# Patient Record
Sex: MALE | Race: WHITE | NOT HISPANIC OR LATINO | Employment: OTHER | URBAN - METROPOLITAN AREA
[De-identification: names, ages, dates, MRNs, and addresses within clinical notes are randomized per-mention and may not be internally consistent; named-entity substitution may affect disease eponyms.]

---

## 2017-04-30 ENCOUNTER — OFFICE VISIT (OUTPATIENT)
Dept: URGENT CARE | Facility: CLINIC | Age: 72
End: 2017-04-30
Payer: MEDICARE

## 2017-04-30 ENCOUNTER — HOSPITAL ENCOUNTER (OUTPATIENT)
Dept: RADIOLOGY | Facility: CLINIC | Age: 72
Discharge: HOME | End: 2017-04-30
Admitting: EMERGENCY MEDICINE
Payer: MEDICARE

## 2017-04-30 DIAGNOSIS — R50.9 FEVER: ICD-10-CM

## 2017-04-30 PROCEDURE — 99204 OFFICE O/P NEW MOD 45 MIN: CPT

## 2017-04-30 PROCEDURE — G0463 HOSPITAL OUTPT CLINIC VISIT: HCPCS

## 2017-04-30 PROCEDURE — 71020 HB CHEST X-RAY 2VW FRONTAL&LATL: CPT

## 2018-03-10 ENCOUNTER — OFFICE VISIT (OUTPATIENT)
Dept: URGENT CARE | Facility: CLINIC | Age: 73
End: 2018-03-10
Payer: MEDICARE

## 2018-03-10 VITALS
TEMPERATURE: 99 F | OXYGEN SATURATION: 98 % | HEART RATE: 65 BPM | DIASTOLIC BLOOD PRESSURE: 72 MMHG | SYSTOLIC BLOOD PRESSURE: 151 MMHG

## 2018-03-10 DIAGNOSIS — J06.9 UPPER RESPIRATORY TRACT INFECTION, UNSPECIFIED TYPE: Primary | ICD-10-CM

## 2018-03-10 PROCEDURE — 99213 OFFICE O/P EST LOW 20 MIN: CPT | Performed by: PHYSICIAN ASSISTANT

## 2018-03-10 PROCEDURE — G0463 HOSPITAL OUTPT CLINIC VISIT: HCPCS | Performed by: PHYSICIAN ASSISTANT

## 2018-03-10 RX ORDER — OXYCODONE AND ACETAMINOPHEN 7.5; 325 MG/1; MG/1
7.5 TABLET ORAL 2 TIMES DAILY PRN
COMMUNITY

## 2018-03-10 RX ORDER — TAMSULOSIN HYDROCHLORIDE 0.4 MG/1
CAPSULE ORAL
COMMUNITY

## 2018-03-10 RX ORDER — FOLIC ACID 1 MG/1
3 TABLET ORAL DAILY
COMMUNITY

## 2018-03-10 RX ORDER — PREDNISONE 10 MG/1
TABLET ORAL
COMMUNITY
End: 2018-08-08

## 2018-03-10 RX ORDER — AZITHROMYCIN 250 MG/1
TABLET, FILM COATED ORAL
Qty: 6 TABLET | Refills: 0 | Status: SHIPPED | OUTPATIENT
Start: 2018-03-10 | End: 2018-03-15

## 2018-03-10 RX ORDER — NICOTINE POLACRILEX 4 MG/1
GUM, CHEWING ORAL
COMMUNITY
End: 2019-04-05

## 2018-03-10 NOTE — PROGRESS NOTES
[facility]    NAME: Pamela Mena is a 67 y o  male  : 1945    MRN: [id number]  DATE: March 10, 2018  TIME: 8:47 AM    Assessment and Plan   Upper respiratory tract infection, unspecified type [J06 9]  1  Upper respiratory tract infection, unspecified type  azithromycin (ZITHROMAX) 250 mg tablet       Patient Instructions     Patient Instructions   I have prescribed an antibiotic for the infection  Please take the antibiotic as prescribed and finish the entire prescription  I recommend that the patient takes an over the counter probiotic or eats yogurt with live cultures in it [place]) to keep good bacteria in the gut and help prevent diarrhea  Wash hands frequently to prevent the spread of infection  Can use over the counter cough and cold medications to help with symptoms  Ibuprofen and/or tylenol as needed for pain or fever  If not improving over the next 7-10 days, follow up with PCP  Chief Complaint     Chief Complaint   Patient presents with    Extremity Pain     Since Wednesday, 'might have a fever' 'May be my polymylagia"       History of Present Illness   71-year-old male here with complaint of cold symptoms for the last 3-4 days  Has a lot of body aches and myalgias  Patient has a history of PMR and use unsure if that is flaring up or not  Has a lot of nasal congestion with a cough that is productive at times of mucus  Also has a low-grade fever  Has a mild sore throat  Review of Systems   Review of Systems   Constitutional: Positive for fatigue and fever  Negative for activity change, appetite change, chills, diaphoresis and unexpected weight change  HENT: Positive for congestion, postnasal drip, rhinorrhea, sinus pressure and sore throat  Negative for ear pain, hearing loss, sneezing and tinnitus  Eyes: Negative for photophobia, redness and visual disturbance  Respiratory: Positive for cough   Negative for apnea, chest tightness, shortness of breath, wheezing and stridor  Cardiovascular: Negative for chest pain, palpitations and leg swelling  Gastrointestinal: Negative for abdominal distention, abdominal pain, blood in stool, constipation, diarrhea, nausea and vomiting  Endocrine: Negative for cold intolerance, heat intolerance, polydipsia, polyphagia and polyuria  Genitourinary: Negative for difficulty urinating, dysuria, flank pain, hematuria and urgency  Musculoskeletal: Negative for arthralgias, back pain, gait problem, myalgias, neck pain and neck stiffness  Skin: Negative for rash and wound  Neurological: Negative for dizziness, tremors, seizures, syncope, weakness, light-headedness, numbness and headaches  Hematological: Negative for adenopathy  Does not bruise/bleed easily  Psychiatric/Behavioral: Negative for agitation, behavioral problems, confusion and decreased concentration  The patient is not nervous/anxious  All other systems reviewed and are negative  Current Medications     Current Outpatient Prescriptions:     azithromycin (ZITHROMAX) 250 mg tablet, Take 2 tablets then one tablet daily for 4 days  , Disp: 6 tablet, Rfl: 0    cholecalciferol (VITAMIN D3) 1,000 units tablet, Take by mouth, Disp: , Rfl:     folic acid (FOLVITE) 1 mg tablet, Take by mouth, Disp: , Rfl:     methotrexate 2 5 mg tablet, Take by mouth, Disp: , Rfl:     Omeprazole 20 MG TBEC, Take by mouth, Disp: , Rfl:     oxyCODONE-acetaminophen (PERCOCET) 7 5-325 MG per tablet, Take 7 5 tablets by mouth 2 (two) times a day as needed, Disp: , Rfl:     predniSONE 10 mg tablet, Take by mouth, Disp: , Rfl:     tamsulosin (FLOMAX) 0 4 mg, Take by mouth, Disp: , Rfl:     Current Allergies     Allergies as of 03/10/2018    (No Known Allergies)          The following portions of the patient's history were reviewed and updated as appropriate: allergies, current medications, past family history, past medical history, past social history, past surgical history and problem list      Objective   /72   Pulse 65   Temp 99 °F (37 2 °C)   SpO2 98%      Physical Exam   Physical Exam   Constitutional: He appears well-developed and well-nourished  No distress  HENT:   Head: Normocephalic  Right Ear: Tympanic membrane and external ear normal    Left Ear: Tympanic membrane and external ear normal    Nose: Mucosal edema and rhinorrhea present  Mouth/Throat: Posterior oropharyngeal edema and posterior oropharyngeal erythema present  No oropharyngeal exudate  Neck: Normal range of motion  Neck supple  Cardiovascular: Normal rate, regular rhythm and normal heart sounds  No murmur heard  Pulmonary/Chest: Effort normal and breath sounds normal  No respiratory distress  He has no wheezes  He has no rales  Abdominal: Soft  Bowel sounds are normal  There is no tenderness  Musculoskeletal: Normal range of motion  Lymphadenopathy:     He has no cervical adenopathy  Skin: Skin is warm  No rash noted

## 2018-08-08 ENCOUNTER — HOSPITAL ENCOUNTER (EMERGENCY)
Facility: HOSPITAL | Age: 73
End: 2018-08-08
Attending: EMERGENCY MEDICINE | Admitting: EMERGENCY MEDICINE
Payer: MEDICARE

## 2018-08-08 ENCOUNTER — HOSPITAL ENCOUNTER (INPATIENT)
Dept: NON INVASIVE DIAGNOSTICS | Facility: HOSPITAL | Age: 73
LOS: 2 days | Discharge: HOME | DRG: 247 | End: 2018-08-10
Attending: INTERNAL MEDICINE | Admitting: INTERNAL MEDICINE
Payer: MEDICARE

## 2018-08-08 ENCOUNTER — APPOINTMENT (INPATIENT)
Dept: NON INVASIVE DIAGNOSTICS | Facility: HOSPITAL | Age: 73
DRG: 247 | End: 2018-08-08
Payer: MEDICARE

## 2018-08-08 VITALS
TEMPERATURE: 96.5 F | HEART RATE: 54 BPM | BODY MASS INDEX: 28.63 KG/M2 | WEIGHT: 200 LBS | SYSTOLIC BLOOD PRESSURE: 164 MMHG | DIASTOLIC BLOOD PRESSURE: 89 MMHG | RESPIRATION RATE: 24 BRPM | OXYGEN SATURATION: 100 % | HEIGHT: 70 IN

## 2018-08-08 DIAGNOSIS — I21.3 ACUTE ST ELEVATION MYOCARDIAL INFARCTION (STEMI), UNSPECIFIED ARTERY (HCC): Primary | ICD-10-CM

## 2018-08-08 DIAGNOSIS — I21.19 ACUTE ST ELEVATION MYOCARDIAL INFARCTION (STEMI) OF INFERIOR WALL (HCC): Primary | ICD-10-CM

## 2018-08-08 DIAGNOSIS — I21.9 MI (MYOCARDIAL INFARCTION) (HCC): ICD-10-CM

## 2018-08-08 PROBLEM — E87.6 HYPOKALEMIA: Status: ACTIVE | Noted: 2018-08-08

## 2018-08-08 PROBLEM — Z95.5 S/P DRUG ELUTING CORONARY STENT PLACEMENT: Status: ACTIVE | Noted: 2018-08-08

## 2018-08-08 LAB
ALBUMIN SERPL BCP-MCNC: 4.4 G/DL (ref 3.5–5.7)
ALP SERPL-CCNC: 47 U/L (ref 55–165)
ALT SERPL W P-5'-P-CCNC: 29 U/L (ref 7–52)
ANION GAP SERPL CALCULATED.3IONS-SCNC: 7 MMOL/L (ref 4–13)
ANION GAP SERPL CALCULATED.3IONS-SCNC: 9 MMOL/L (ref 4–13)
APTT PPP: 29 SECONDS (ref 24–36)
AST SERPL W P-5'-P-CCNC: 20 U/L (ref 13–39)
ATRIAL RATE: 57 BPM
ATRIAL RATE: 58 BPM
ATRIAL RATE: 62 BPM
BASOPHILS # BLD AUTO: 0 THOUSANDS/ΜL (ref 0–0.1)
BASOPHILS NFR BLD AUTO: 0 % (ref 0–2)
BILIRUB SERPL-MCNC: 0.6 MG/DL (ref 0.2–1)
BUN SERPL-MCNC: 12 MG/DL (ref 5–25)
BUN SERPL-MCNC: 13 MG/DL (ref 7–25)
CALCIUM SERPL-MCNC: 8.4 MG/DL (ref 8.3–10.1)
CALCIUM SERPL-MCNC: 9.5 MG/DL (ref 8.6–10.5)
CHLORIDE SERPL-SCNC: 105 MMOL/L (ref 98–107)
CHLORIDE SERPL-SCNC: 107 MMOL/L (ref 100–108)
CHOLEST SERPL-MCNC: 177 MG/DL (ref 0–200)
CO2 SERPL-SCNC: 25 MMOL/L (ref 21–32)
CO2 SERPL-SCNC: 27 MMOL/L (ref 21–31)
CREAT SERPL-MCNC: 1.04 MG/DL (ref 0.6–1.3)
CREAT SERPL-MCNC: 1.15 MG/DL (ref 0.7–1.3)
EOSINOPHIL # BLD AUTO: 0.1 THOUSAND/ΜL (ref 0–0.61)
EOSINOPHIL NFR BLD AUTO: 2 % (ref 0–5)
ERYTHROCYTE [DISTWIDTH] IN BLOOD BY AUTOMATED COUNT: 13.6 % (ref 11.5–14.5)
GFR SERPL CREATININE-BSD FRML MDRD: 63 ML/MIN/1.73SQ M
GFR SERPL CREATININE-BSD FRML MDRD: 71 ML/MIN/1.73SQ M
GLUCOSE SERPL-MCNC: 116 MG/DL (ref 65–140)
GLUCOSE SERPL-MCNC: 96 MG/DL (ref 65–99)
HCT VFR BLD AUTO: 41.8 % (ref 36.5–49.3)
HDLC SERPL-MCNC: 38 MG/DL (ref 40–60)
HGB BLD-MCNC: 14.7 G/DL (ref 14–18)
INR PPP: 0.97 (ref 0.9–1.5)
KCT BLD-ACNC: 248 SEC (ref 89–137)
LDLC SERPL CALC-MCNC: 104 MG/DL (ref 75–193)
LYMPHOCYTES # BLD AUTO: 3.1 THOUSANDS/ΜL (ref 0.6–4.47)
LYMPHOCYTES NFR BLD AUTO: 47 % (ref 21–51)
MAGNESIUM SERPL-MCNC: 1.8 MG/DL (ref 1.6–2.6)
MAGNESIUM SERPL-MCNC: 1.8 MG/DL (ref 1.9–2.7)
MCH RBC QN AUTO: 33.1 PG (ref 26–34)
MCHC RBC AUTO-ENTMCNC: 35 G/DL (ref 31–37)
MCV RBC AUTO: 94 FL (ref 81–99)
MONOCYTES # BLD AUTO: 0.7 THOUSAND/ΜL (ref 0.17–1.22)
MONOCYTES NFR BLD AUTO: 11 % (ref 2–12)
NEUTROPHILS # BLD AUTO: 2.7 THOUSANDS/ΜL (ref 1.4–6.5)
NEUTS SEG NFR BLD AUTO: 40 % (ref 42–75)
NONHDLC SERPL-MCNC: 139 MG/DL
NRBC BLD AUTO-RTO: 0 /100 WBCS
P AXIS: 61 DEGREES
P AXIS: 64 DEGREES
P AXIS: 66 DEGREES
PLATELET # BLD AUTO: 166 THOUSANDS/UL (ref 149–390)
PLATELET # BLD AUTO: 216 THOUSANDS/UL (ref 149–390)
PMV BLD AUTO: 10 FL (ref 8.9–12.7)
PMV BLD AUTO: 8.5 FL (ref 8.6–11.7)
POTASSIUM SERPL-SCNC: 3.3 MMOL/L (ref 3.5–5.5)
POTASSIUM SERPL-SCNC: 4.2 MMOL/L (ref 3.5–5.3)
PR INTERVAL: 120 MS
PR INTERVAL: 179 MS
PR INTERVAL: 183 MS
PROT SERPL-MCNC: 6.4 G/DL (ref 6.4–8.9)
PROTHROMBIN TIME: 11.3 SECONDS (ref 10.1–12.9)
QRS AXIS: 29 DEGREES
QRS AXIS: 58 DEGREES
QRS AXIS: 64 DEGREES
QRSD INTERVAL: 88 MS
QRSD INTERVAL: 92 MS
QRSD INTERVAL: 94 MS
QT INTERVAL: 398 MS
QT INTERVAL: 429 MS
QT INTERVAL: 450 MS
QTC INTERVAL: 403 MS
QTC INTERVAL: 418 MS
QTC INTERVAL: 442 MS
RBC # BLD AUTO: 4.43 MILLION/UL (ref 4.3–5.9)
SODIUM SERPL-SCNC: 139 MMOL/L (ref 136–145)
SODIUM SERPL-SCNC: 141 MMOL/L (ref 134–143)
SPECIMEN SOURCE: ABNORMAL
T WAVE AXIS: 104 DEGREES
T WAVE AXIS: 26 DEGREES
T WAVE AXIS: 65 DEGREES
TRIGL SERPL-MCNC: 173 MG/DL (ref 44–166)
TROPONIN I SERPL-MCNC: 0.04 NG/ML
TROPONIN I SERPL-MCNC: 0.37 NG/ML
TROPONIN I SERPL-MCNC: 1.05 NG/ML
TROPONIN I SERPL-MCNC: 1.38 NG/ML
VENTRICULAR RATE: 57 BPM
VENTRICULAR RATE: 58 BPM
VENTRICULAR RATE: 62 BPM
WBC # BLD AUTO: 6.7 THOUSAND/UL (ref 4.8–10.8)

## 2018-08-08 PROCEDURE — 99152 MOD SED SAME PHYS/QHP 5/>YRS: CPT | Performed by: INTERNAL MEDICINE

## 2018-08-08 PROCEDURE — 85347 COAGULATION TIME ACTIVATED: CPT

## 2018-08-08 PROCEDURE — C1874 STENT, COATED/COV W/DEL SYS: HCPCS

## 2018-08-08 PROCEDURE — 93454 CORONARY ARTERY ANGIO S&I: CPT | Performed by: INTERNAL MEDICINE

## 2018-08-08 PROCEDURE — 83735 ASSAY OF MAGNESIUM: CPT | Performed by: PHYSICIAN ASSISTANT

## 2018-08-08 PROCEDURE — 84484 ASSAY OF TROPONIN QUANT: CPT | Performed by: EMERGENCY MEDICINE

## 2018-08-08 PROCEDURE — 85610 PROTHROMBIN TIME: CPT | Performed by: EMERGENCY MEDICINE

## 2018-08-08 PROCEDURE — 83735 ASSAY OF MAGNESIUM: CPT | Performed by: EMERGENCY MEDICINE

## 2018-08-08 PROCEDURE — C1725 CATH, TRANSLUMIN NON-LASER: HCPCS | Performed by: INTERNAL MEDICINE

## 2018-08-08 PROCEDURE — 85049 AUTOMATED PLATELET COUNT: CPT | Performed by: INTERNAL MEDICINE

## 2018-08-08 PROCEDURE — B2111ZZ FLUOROSCOPY OF MULTIPLE CORONARY ARTERIES USING LOW OSMOLAR CONTRAST: ICD-10-PCS | Performed by: INTERNAL MEDICINE

## 2018-08-08 PROCEDURE — C1769 GUIDE WIRE: HCPCS | Performed by: INTERNAL MEDICINE

## 2018-08-08 PROCEDURE — C1887 CATHETER, GUIDING: HCPCS | Performed by: INTERNAL MEDICINE

## 2018-08-08 PROCEDURE — 92941 PRQ TRLML REVSC TOT OCCL AMI: CPT | Performed by: INTERNAL MEDICINE

## 2018-08-08 PROCEDURE — 36415 COLL VENOUS BLD VENIPUNCTURE: CPT | Performed by: EMERGENCY MEDICINE

## 2018-08-08 PROCEDURE — C1894 INTRO/SHEATH, NON-LASER: HCPCS | Performed by: INTERNAL MEDICINE

## 2018-08-08 PROCEDURE — 96374 THER/PROPH/DIAG INJ IV PUSH: CPT

## 2018-08-08 PROCEDURE — 85730 THROMBOPLASTIN TIME PARTIAL: CPT | Performed by: EMERGENCY MEDICINE

## 2018-08-08 PROCEDURE — 93010 ELECTROCARDIOGRAM REPORT: CPT | Performed by: INTERNAL MEDICINE

## 2018-08-08 PROCEDURE — 93005 ELECTROCARDIOGRAM TRACING: CPT

## 2018-08-08 PROCEDURE — 84484 ASSAY OF TROPONIN QUANT: CPT | Performed by: INTERNAL MEDICINE

## 2018-08-08 PROCEDURE — 80061 LIPID PANEL: CPT | Performed by: EMERGENCY MEDICINE

## 2018-08-08 PROCEDURE — 99153 MOD SED SAME PHYS/QHP EA: CPT | Performed by: INTERNAL MEDICINE

## 2018-08-08 PROCEDURE — 80048 BASIC METABOLIC PNL TOTAL CA: CPT | Performed by: PHYSICIAN ASSISTANT

## 2018-08-08 PROCEDURE — 85025 COMPLETE CBC W/AUTO DIFF WBC: CPT | Performed by: EMERGENCY MEDICINE

## 2018-08-08 PROCEDURE — C9606 PERC D-E COR REVASC W AMI S: HCPCS | Performed by: INTERNAL MEDICINE

## 2018-08-08 PROCEDURE — 99232 SBSQ HOSP IP/OBS MODERATE 35: CPT | Performed by: EMERGENCY MEDICINE

## 2018-08-08 PROCEDURE — 99285 EMERGENCY DEPT VISIT HI MDM: CPT

## 2018-08-08 PROCEDURE — 93306 TTE W/DOPPLER COMPLETE: CPT

## 2018-08-08 PROCEDURE — 027034Z DILATION OF CORONARY ARTERY, ONE ARTERY WITH DRUG-ELUTING INTRALUMINAL DEVICE, PERCUTANEOUS APPROACH: ICD-10-PCS | Performed by: INTERNAL MEDICINE

## 2018-08-08 PROCEDURE — 80053 COMPREHEN METABOLIC PANEL: CPT | Performed by: EMERGENCY MEDICINE

## 2018-08-08 RX ORDER — TAMSULOSIN HYDROCHLORIDE 0.4 MG/1
0.4 CAPSULE ORAL
Status: DISCONTINUED | OUTPATIENT
Start: 2018-08-08 | End: 2018-08-09

## 2018-08-08 RX ORDER — HEPARIN SODIUM 5000 [USP'U]/ML
5000 INJECTION, SOLUTION INTRAVENOUS; SUBCUTANEOUS EVERY 8 HOURS SCHEDULED
Status: DISCONTINUED | OUTPATIENT
Start: 2018-08-09 | End: 2018-08-09

## 2018-08-08 RX ORDER — VERAPAMIL HCL 2.5 MG/ML
AMPUL (ML) INTRAVENOUS CODE/TRAUMA/SEDATION MEDICATION
Status: COMPLETED | OUTPATIENT
Start: 2018-08-08 | End: 2018-08-08

## 2018-08-08 RX ORDER — NITROGLYCERIN 0.4 MG/1
0.4 TABLET SUBLINGUAL
Status: DISCONTINUED | OUTPATIENT
Start: 2018-08-08 | End: 2018-08-09

## 2018-08-08 RX ORDER — MELATONIN
1000 DAILY
Status: DISCONTINUED | OUTPATIENT
Start: 2018-08-09 | End: 2018-08-09

## 2018-08-08 RX ORDER — ONDANSETRON 2 MG/ML
4 INJECTION INTRAMUSCULAR; INTRAVENOUS EVERY 6 HOURS PRN
Status: DISCONTINUED | OUTPATIENT
Start: 2018-08-08 | End: 2018-08-09

## 2018-08-08 RX ORDER — ASPIRIN 81 MG/1
324 TABLET, CHEWABLE ORAL ONCE
Status: COMPLETED | OUTPATIENT
Start: 2018-08-08 | End: 2018-08-08

## 2018-08-08 RX ORDER — 0.9 % SODIUM CHLORIDE 0.9 %
3 VIAL (ML) INJECTION AS NEEDED
Status: DISCONTINUED | OUTPATIENT
Start: 2018-08-08 | End: 2018-08-08 | Stop reason: HOSPADM

## 2018-08-08 RX ORDER — FENTANYL CITRATE 50 UG/ML
INJECTION, SOLUTION INTRAMUSCULAR; INTRAVENOUS CODE/TRAUMA/SEDATION MEDICATION
Status: COMPLETED | OUTPATIENT
Start: 2018-08-08 | End: 2018-08-08

## 2018-08-08 RX ORDER — ACETAMINOPHEN 325 MG/1
650 TABLET ORAL EVERY 6 HOURS PRN
Status: DISCONTINUED | OUTPATIENT
Start: 2018-08-08 | End: 2018-08-09

## 2018-08-08 RX ORDER — ASPIRIN 81 MG/1
81 TABLET, CHEWABLE ORAL DAILY
Status: DISCONTINUED | OUTPATIENT
Start: 2018-08-09 | End: 2018-08-09

## 2018-08-08 RX ORDER — LIDOCAINE HYDROCHLORIDE 10 MG/ML
INJECTION, SOLUTION INFILTRATION; PERINEURAL CODE/TRAUMA/SEDATION MEDICATION
Status: COMPLETED | OUTPATIENT
Start: 2018-08-08 | End: 2018-08-08

## 2018-08-08 RX ORDER — MIDAZOLAM HYDROCHLORIDE 1 MG/ML
INJECTION INTRAMUSCULAR; INTRAVENOUS CODE/TRAUMA/SEDATION MEDICATION
Status: COMPLETED | OUTPATIENT
Start: 2018-08-08 | End: 2018-08-08

## 2018-08-08 RX ORDER — HEPARIN SODIUM 1000 [USP'U]/ML
INJECTION, SOLUTION INTRAVENOUS; SUBCUTANEOUS CODE/TRAUMA/SEDATION MEDICATION
Status: COMPLETED | OUTPATIENT
Start: 2018-08-08 | End: 2018-08-08

## 2018-08-08 RX ORDER — PANTOPRAZOLE SODIUM 40 MG/1
40 TABLET, DELAYED RELEASE ORAL
Status: DISCONTINUED | OUTPATIENT
Start: 2018-08-09 | End: 2018-08-09

## 2018-08-08 RX ORDER — ATORVASTATIN CALCIUM 80 MG/1
80 TABLET, FILM COATED ORAL EVERY EVENING
Status: DISCONTINUED | OUTPATIENT
Start: 2018-08-08 | End: 2018-08-09

## 2018-08-08 RX ORDER — NITROGLYCERIN 20 MG/100ML
INJECTION INTRAVENOUS CODE/TRAUMA/SEDATION MEDICATION
Status: COMPLETED | OUTPATIENT
Start: 2018-08-08 | End: 2018-08-08

## 2018-08-08 RX ORDER — NICOTINE 21 MG/24HR
1 PATCH, TRANSDERMAL 24 HOURS TRANSDERMAL DAILY
Status: DISCONTINUED | OUTPATIENT
Start: 2018-08-08 | End: 2018-08-09

## 2018-08-08 RX ORDER — FOLIC ACID 1 MG/1
1 TABLET ORAL DAILY
Status: DISCONTINUED | OUTPATIENT
Start: 2018-08-09 | End: 2018-08-09

## 2018-08-08 RX ORDER — HEPARIN SODIUM 1000 [USP'U]/ML
5000 INJECTION, SOLUTION INTRAVENOUS; SUBCUTANEOUS ONCE
Status: COMPLETED | OUTPATIENT
Start: 2018-08-08 | End: 2018-08-08

## 2018-08-08 RX ORDER — MAGNESIUM SULFATE HEPTAHYDRATE 40 MG/ML
2 INJECTION, SOLUTION INTRAVENOUS ONCE
Status: COMPLETED | OUTPATIENT
Start: 2018-08-08 | End: 2018-08-08

## 2018-08-08 RX ORDER — SODIUM CHLORIDE 9 MG/ML
100 INJECTION, SOLUTION INTRAVENOUS CONTINUOUS
Status: DISPENSED | OUTPATIENT
Start: 2018-08-08 | End: 2018-08-08

## 2018-08-08 RX ADMIN — NITROGLYCERIN 200 MCG: 20 INJECTION INTRAVENOUS at 11:38

## 2018-08-08 RX ADMIN — TICAGRELOR 180 MG: 90 TABLET ORAL at 10:30

## 2018-08-08 RX ADMIN — IOHEXOL 100 ML: 350 INJECTION, SOLUTION INTRAVENOUS at 11:47

## 2018-08-08 RX ADMIN — ATORVASTATIN CALCIUM 80 MG: 80 TABLET, FILM COATED ORAL at 18:35

## 2018-08-08 RX ADMIN — VERAPAMIL HYDROCHLORIDE 1.25 MG: 2.5 INJECTION, SOLUTION INTRAVENOUS at 11:24

## 2018-08-08 RX ADMIN — MIDAZOLAM 2 MG: 1 INJECTION INTRAMUSCULAR; INTRAVENOUS at 11:19

## 2018-08-08 RX ADMIN — NITROGLYCERIN 1 INCH: 20 OINTMENT TOPICAL at 10:10

## 2018-08-08 RX ADMIN — HEPARIN SODIUM 4000 UNITS: 1000 INJECTION INTRAVENOUS; SUBCUTANEOUS at 11:24

## 2018-08-08 RX ADMIN — HEPARIN SODIUM 5000 UNITS: 1000 INJECTION INTRAVENOUS; SUBCUTANEOUS at 10:10

## 2018-08-08 RX ADMIN — HEPARIN SODIUM 5000 UNITS: 1000 INJECTION INTRAVENOUS; SUBCUTANEOUS at 11:31

## 2018-08-08 RX ADMIN — MAGNESIUM SULFATE HEPTAHYDRATE 2 G: 40 INJECTION, SOLUTION INTRAVENOUS at 14:48

## 2018-08-08 RX ADMIN — LIDOCAINE HYDROCHLORIDE 0.1 ML: 10 INJECTION, SOLUTION INFILTRATION; PERINEURAL at 11:22

## 2018-08-08 RX ADMIN — SODIUM CHLORIDE 100 ML/HR: 0.9 INJECTION, SOLUTION INTRAVENOUS at 12:30

## 2018-08-08 RX ADMIN — NITROGLYCERIN 200 MCG: 20 INJECTION INTRAVENOUS at 11:24

## 2018-08-08 RX ADMIN — TAMSULOSIN HYDROCHLORIDE 0.4 MG: 0.4 CAPSULE ORAL at 18:35

## 2018-08-08 RX ADMIN — FENTANYL CITRATE 50 MCG: 50 INJECTION, SOLUTION INTRAMUSCULAR; INTRAVENOUS at 11:19

## 2018-08-08 RX ADMIN — ASPIRIN 81 MG 324 MG: 81 TABLET ORAL at 10:05

## 2018-08-08 RX ADMIN — TICAGRELOR 90 MG: 90 TABLET ORAL at 21:39

## 2018-08-08 NOTE — PROGRESS NOTES
I personally evaluated the patient in the ICU s/p his cardiac catheterization procedure  The patient initially presented to the ED at [facility] early this am w/ c/o severe and persistent substernal chest pain  12 lead EKG revealed an inferior wall STEMI with reciprocal changes in the lateral leads  He was loaded w/ 325 mg of aspirin, 180 mg of Brilinta, and 5000 units of heparin  Patient emergently transferred to [facility] cardiac cath lab for PCI  The cath procedure revealed a 99% lesion in the mid RCA  A [place] [employer] 3 5 x 23mm drug-eluting stent was placed across the lesion; deemed as the culprit of the patient's STEMI per Dr Evon Garza  Patient was transferred to ICU bed 513  The patient is currently without complaints of chest pain, palpitations, shortness of breath, dizziness lightheadedness, N/V/D, or abdominal pain  His TR band remains intact; there is scant amt of bloody drainage noted  Neurovascular assessment WNL  The patient remains on IVF NSS @100 ml/hr; on subq heparin for VTE prophylaxis; he is ordered DAPT with  aspirin 81 mg daily and Brilinta 90 mg BID, high intensity statin therapy w/ Atorvastatin 80 mg daily; continuing to hold beta-blocker therapy secondary to aysmpytomatic bradycardia w/ HR in the low 50's; BP is stable last recorded at 115/71; sating 98% on RA    Lab work from this am as follows: Na+ 139, K+ 4 2, CO2 25, BUN 12, creat 1 04, calcium 8 4, Mag 1 8, trop 0 37; WBC 6 70, Hgb 14 7, PLT   Lipid profile, TSH, and Hemoglobin A1C ordered for the am      Follow up echocardiogram has been ordered for today       [facility]

## 2018-08-08 NOTE — CONSULTS
[facility]  [facility] 67 y o  male MRN: [id number]  Unit/Bed#: [facility] 584-14 Encounter: [id number]    Physician Requesting Consult: Anil Lopez MD    Reason for Consultation / Chief Complaint: Inferior wall STEMI    History of Present Illness:  Lencho Bergman is a 67 y o  male who presents to the ICU s/p cardiac catheterization with mid RCA ALVIN placement  He presented to the  ER earlier today with acute anterior chest pressure that radiated down his right arm  He was found to have ST elevations in the inferior leads with reciprocal changes in the lateral leads  He was loaded with aspirin, Brilinta, and heparin and transferred to [facility] for cardiac catheterization, which revealed 99% stenosis in the mid RCA  Drug-eluting stent was placed successfully and patient was transferred to ICU after the procedure  History obtained from chart review  Past Medical History:  Past Medical History:   Diagnosis Date    GERD (gastroesophageal reflux disease)     Polymyalgia ([address] )        Past Surgical History:  Past Surgical History:   Procedure Laterality Date    BACK SURGERY         Past Family History:  No family history on file  Social History:  History   Smoking Status    Light Tobacco Smoker   Smokeless Tobacco    Never Used     History   Alcohol Use No     History   Drug Use No     Marital Status: /Civil Union    Home Medications:   Prior to Admission medications    Medication Sig Start Date End Date Taking?  Authorizing Provider   cholecalciferol (VITAMIN D3) 1,000 units tablet Take by mouth    Historical Provider, MD   folic acid (FOLVITE) 1 mg tablet Take by mouth    Historical Provider, MD   methotrexate 2 5 mg tablet Take by mouth    Historical Provider, MD   Omeprazole 20 MG TBEC Take by mouth    Historical Provider, MD   oxyCODONE-acetaminophen (PERCOCET) 7 5-325 MG per tablet Take 7 5 tablets by mouth 2 (two) times a day as needed    Historical Provider, MD   tamsulosin (FLOMAX) 0 4 mg Take by mouth    Historical Provider, MD   predniSONE 10 mg tablet Take by mouth  8/8/18  Historical Provider, MD       Inpatient Medications:  Scheduled Meds:    Current Facility-Administered Medications:  acetaminophen 650 mg Oral Q6H PRN Anitha Mooney MD    [START ON 8/9/2018] aspirin 81 mg Oral Daily Rai Mukherjee MD    atorvastatin 80 mg Oral QPM Anitha Mooney MD    [START ON 8/9/2018] cholecalciferol 1,000 Units Oral Daily Alisha Areas, CRNP    [START ON 7/5/4038] folic acid 1 mg Oral Daily Alisha MARLENE Freed    [START ON 8/9/2018] heparin (porcine) 5,000 Units Subcutaneous Q8H Hira Manriquez MD    [START ON 8/14/2018] methotrexate 2 5 mg Oral Weekly AlishaMARLENE Reyes    nicotine 1 patch Transdermal Daily Anitha Mooney MD    nitroglycerin 0 4 mg Sublingual Q5 Min PRN Anitha Mooney MD    ondansetron 4 mg Intravenous Q6H PRN Anitha Mooney MD    ondansetron 4 mg Intravenous Q6H PRN Fam Medina PA-C    [START ON 8/9/2018] pantoprazole 40 mg Oral Early [facility]    sodium chloride 100 mL/hr Intravenous Continuous Anitha Mooney MD Last Rate: 100 mL/hr (08/08/18 1230)   tamsulosin 0 4 mg Oral Daily With Dinner MARLENE Napoles    ticagrelor 180 mg Oral Once Anitha Mooney MD    Followed by        ticagrelor 90 mg Oral BID MARLENE Sesay      Continuous Infusions:    sodium chloride 100 mL/hr Last Rate: 100 mL/hr (08/08/18 1230)     PRN Meds:    acetaminophen 650 mg Q6H PRN   nitroglycerin 0 4 mg Q5 Min PRN   ondansetron 4 mg Q6H PRN   ondansetron 4 mg Q6H PRN       Allergies:  No Known Allergies    ROS:   Review of Systems   Constitutional: Negative for activity change, appetite change, chills, diaphoresis, fatigue and fever  HENT: Negative for congestion, sinus pressure, sneezing and sore throat  Eyes: Negative for pain, redness and visual disturbance     Respiratory: Negative for apnea, cough, choking, chest tightness, shortness of breath, wheezing and stridor  Cardiovascular: Negative for chest pain, palpitations and leg swelling  Gastrointestinal: Negative for abdominal distention, abdominal pain, constipation, diarrhea, nausea and vomiting  Genitourinary: Negative for decreased urine volume, difficulty urinating, dysuria, flank pain and urgency  Musculoskeletal: Negative for arthralgias, back pain and gait problem  Skin: Negative for color change, pallor, rash and wound  Neurological: Negative for dizziness, tremors, seizures, syncope, facial asymmetry, weakness and headaches  Vitals:  Vitals:    18 1138 18 1230 18 1245   BP:  118/73 129/64   Pulse:  (!) 54 (!) 54   Weight: 100 kg (220 lb 14 4 oz)       Temperature:   Temp (24hrs), Av 5 °F (35 8 °C), Min:96 5 °F (35 8 °C), Max:96 5 °F (35 8 °C)    Current      Weights:   IBW: -88 kg  Body mass index is 31 7 kg/m²  Non-Invasive/Invasive Ventilation Settings:  Respiratory    Lab Data (Last 4 hours)    None         O2/Vent Data (Last 4 hours)    None              No results found for: PHART, TSJ1NZL, PO2ART, EJW0JYI, C7LHSNYV, BEART, SOURCE  SpO2:       Physical Exam:   Physical Exam   Constitutional: He is oriented to person, place, and time  Vital signs are normal  He appears well-developed and well-nourished  No distress  Elderly male resting comfortably in bed   HENT:   Head: Normocephalic and atraumatic  Eyes: Pupils are equal, round, and reactive to light  Cardiovascular: Normal rate and regular rhythm  Pulses:       Radial pulses are 2+ on the right side, and 2+ on the left side  Dorsalis pedis pulses are 2+ on the right side, and 2+ on the left side  Pulmonary/Chest: No accessory muscle usage  No respiratory distress  He has no decreased breath sounds  He has no wheezes  He has no rhonchi  He has no rales  Abdominal: Soft  Normal appearance  He exhibits no distension  There is no tenderness  Musculoskeletal:   Moving all extremities x4 to command  No edema   Neurological: He is alert and oriented to person, place, and time  GCS eye subscore is 4  GCS verbal subscore is 5  GCS motor subscore is 6  Skin: Skin is warm, dry and intact  He is not diaphoretic  Labs:    Results from last 7 days  Lab Units 08/08/18  1015   WBC Thousand/uL 6 70   HEMOGLOBIN g/dL 14 7   HEMATOCRIT % 41 8   PLATELETS Thousands/uL 216   NEUTROS PCT % 40*   MONOS PCT % 11        Results from last 7 days  Lab Units 08/08/18  1015   SODIUM mmol/L 141   POTASSIUM mmol/L 3 3*   CHLORIDE mmol/L 105   CO2 mmol/L 27   BUN mg/dL 13   CREATININE mg/dL 1 15   CALCIUM mg/dL 9 5   TOTAL PROTEIN g/dL 6 4   BILIRUBIN TOTAL mg/dL 0 60   ALK PHOS U/L 47*   ALT U/L 29   AST U/L 20   GLUCOSE RANDOM mg/dL 96       Results from last 7 days  Lab Units 08/08/18  1015   MAGNESIUM mg/dL 1 8*            Results from last 7 days  Lab Units 08/08/18  1015   INR  0 97   PTT seconds 29           0  Lab Value Date/Time   TROPONINI 0 04 (H) 08/08/2018 1015       Imaging: Echo pending I have personally reviewed pertinent reports      ______________________________________________________________________    Assessment and Plan:   Principal Problem:    Acute ST elevation myocardial infarction (STEMI) of inferior wall (HCC)  Active Problems:    S/P drug eluting coronary stent placement    Hypokalemia    Neuro:   · Tylenol for pain  · Delirium precautions    CV:   · Telemetry monitoring  · ASA/Brilinta  · Follow up echo    Lung:   · Maintain SpO2 >92%  · Encourage incentive spirometry, pulmonary toilet    GI:   · Zofran prn for nausea  · Protonix (home med)    F/E/N:   · Optimize electrolytes with goal Mag >2 0, Phos >3 0, K >4 0  · Check BMP/Mag now  · Cardiac diet    :   · Monitor I/Os    ID:   · Monitor fever curve/white count    Heme:   · SQH/SCDs for prophylaxis  · Transfuse Hgb <7 0    Endo:   · Methotrexate weekly (Tuesday) for polymyalgia rheumatica    Msk/Skin:   · Frequent turns and repositioning, offloading    Disposition:   · ICU    Counseling / Coordination of Care  Total Critical Care time spent 0 minutes excluding procedures, teaching and family updates  ______________________________________________________________________    VTE Pharmacologic Prophylaxis: Heparin  VTE Mechanical Prophylaxis: sequential compression device    Invasive lines and devices: Invasive Devices          No matching active lines, drains, or airways          Code Status: Level 1 - Full Code  POA:    POLST:      Given critical illness, patient length of stay will require greater than two midnights        Lorrie Loza PA-C    Consults

## 2018-08-08 NOTE — H&P
H&P Exam - Cardiology   Renzo Sifuentes 67 y o  male MRN: [id number]  Unit/Bed#:  Encounter: [id number]    Assessment/Plan     AssessmentPlan:  >> Inferior wall ST-elevation MI:  Already loaded on aspirin and Brilinta     -proceed with PCI, risks and benefits explained to patient in detail who consents   -will need workup for hyperlipidemia, diabetes and hypertension  -risk factor modification and smoking cessation        History of Present Illness   HPI:  Renzo Sifuentes is a 67 y o  male who presents with sudden onset chest pain that began this morning after he came back from getting coffee  Pain was substernal severe and persistent, his wife brought [facility] that was a short distance away from his home  He was found to have hyperacute ST elevations in inferior leads with reciprocal changes in the lateral leads  He was loaded with 325 of aspirin, 180 of Brilinta, 5000 units of heparin and transferred to [facility]  Currently is complaining of mild chest discomfort about 5/10, no nausea or vomiting, no fever    He has no history of hypertension, diabetes that he is aware of  Family history:  Brother had a myocardial infarction    Social history:  Current active smoker    Past medical history:  Chronic back pain, benign prostatic hypertrophy, obstructive sleep apnea untreated,    Past surgical history:  Back surgeries    Historical Information   Past Medical History:   Diagnosis Date    GERD (gastroesophageal reflux disease)     Polymyalgia (HCC)      Past Surgical History:   Procedure Laterality Date    BACK SURGERY       Social History   History   Alcohol Use No     History   Drug Use No     History   Smoking Status    Light Tobacco Smoker   Smokeless Tobacco    Never Used     Family History: No family history on file      Meds/Allergies   PTA meds:   Cannot display prior to admission medications because the patient has not been admitted in this contact  No Known Allergies    Objective   Vitals: There were no vitals taken for this visit  Afeb, 54, 168/80, 100% ra        No intake or output data in the 24 hours ending 08/08/18 1122    Invasive Devices          No matching active lines, drains, or airways          Review of Systems:  Review of Systems   Constitutional: Negative  HENT: Negative  Eyes: Negative  Respiratory: Negative  Cardiovascular: Positive for chest pain  Gastrointestinal: Negative  Endocrine: Negative  Genitourinary: Negative  Musculoskeletal: Negative  Skin: Negative  Neurological: Negative  Psychiatric/Behavioral: Negative  Physical Exam   General:  AO x3, mildly anxious  Cardiac:  S1-S2 normal  No murmurs, rubs or gallops, JVP: not seen, bradycardia  Lungs:  Clear to auscultation bilaterally, no wheezing or crackles  Abdomen:  Soft nontender nondistended, positive bowel sounds  Extremities:  Warm, well perfused, pulses palpable, no ulcers or rashes  Neuro: Grossly nonfocal  Psych:  Normal affect  Musculoskeletal:  Range of motion normal, no swelling or tenderness over joints  HEENT:  EOM normal, pupils equal and reactive to light  Neck: no palpable thyroid mass  Skin:  no rashes  Back: no pain or tenderness      Lab Results: I have personally reviewed pertinent lab results  Imaging: I have personally reviewed pertinent reports  EKG: Sinus bradycardia with concave upward 1mm darin in 3, avf, reciprocal depressions in 1,avl  ECHO:pending  Previous Cath/PCI: none    Rai Almaraz MD  Cardiology Fellow    ** Please Note: Fluency DirectDictation voice to text software may have been used in the creation of this document   **

## 2018-08-08 NOTE — EMTALA/ACUTE CARE TRANSFER
[address] [facility] [place]  [facility] [place] [address] [place] [id number] [phone]  Dept: [phone]      EMTALA TRANSFER CONSENT    NAME Krishna Loera                                         1945                              MRN [id number]    I have been informed of my rights regarding examination, treatment, and transfer   by Dr Satish Ramos DO    Benefits: Specialized equipment and/or services available at the receiving facility (Include comment)________________________ (PCI)    Risks: Potential for delay in receiving treatment, Potential deterioration of medical condition      Consent for Transfer:  I acknowledge that my medical condition has been evaluated and explained to me by the emergency department physician or other qualified medical person and/or my attending physician, who has recommended that I be transferred to the service of  Accepting Physician: DR Lenka Rosado at [address] Name, [address] : Adrianna Juarez  The above potential benefits of such transfer, the potential risks associated with such transfer, and the probable risks of not being transferred have been explained to me, and I fully understand them  The doctor has explained that, in my case, the benefits of transfer outweigh the risks  I agree to be transferred  I authorize the performance of emergency medical procedures and treatments upon me in both transit and upon arrival at the receiving facility  Additionally, I authorize the release of any and all medical records to the receiving facility and request they be transported with me, if possible  I understand that the safest mode of transportation during a medical emergency is an ambulance and that the Hospital advocates the use of this mode of transport   Risks of traveling to the receiving facility by car, including absence of medical control, life sustaining equipment, such as oxygen, and medical personnel has been explained to me and I fully understand them  (ALPA CORRECT BOX BELOW)  [  ]  I consent to the stated transfer and to be transported by ambulance/helicopter  [  ]  I consent to the stated transfer, but refuse transportation by ambulance and accept full responsibility for my transportation by car  I understand the risks of non-ambulance transfers and I exonerate the Hospital and its staff from any deterioration in my condition that results from this refusal     X___________________________________________    DATE  18  TIME________  Signature of patient or legally responsible individual signing on patient behalf           RELATIONSHIP TO PATIENT_________________________          Provider Certification    NAME Maxi Bernal                                        [facility] 1945                              MRN [id number]    A medical screening exam was performed on the above named patient  Based on the examination:    Condition Necessitating Transfer The encounter diagnosis was Acute ST elevation myocardial infarction (STEMI), unspecified artery ([address] )      Patient Condition: The patient has been stabilized such that within reasonable medical probability, no material deterioration of the patient condition or the condition of the unborn child(fatimah) is likely to result from the transfer, An emergency transfer is being made prior to stabilization due to the need for definitive care and the benefit of transfer outweighs the risk    Reason for Transfer: Level of Care needed not available at this facility    Transfer Requirements: [address]   · Space available and qualified personnel available for treatment as acknowledged by    · Agreed to accept transfer and to provide appropriate medical treatment as acknowledged by       DR Juany Wynn  · Appropriate medical records of the examination and treatment of the patient are provided at the time of transfer   [address] _______  · Transfer will be performed by qualified personnel from    and appropriate transfer equipment as required, including the use of necessary and appropriate life support measures  Provider Certification: I have examined the patient and explained the following risks and benefits of being transferred/refusing transfer to the patient/family:  General risk, such as traffic hazards, adverse weather conditions, rough terrain or turbulence, possible failure of equipment (including vehicle or aircraft), or consequences of actions of persons outside the control of the transport personnel, Risk of worsening condition      Based on these reasonable risks and benefits to the patient and/or the unborn child(fatimah), and based upon the information available at the time of the patients examination, I certify that the medical benefits reasonably to be expected from the provision of appropriate medical treatments at another medical facility outweigh the increasing risks, if any, to the individuals medical condition, and in the case of labor to the unborn child, from effecting the transfer      X____________________________________________ DATE 08/08/18        TIME_______      ORIGINAL - SEND TO MEDICAL RECORDS   COPY - SEND WITH PATIENT DURING TRANSFER

## 2018-08-08 NOTE — ED PROVIDER NOTES
History  Chief Complaint   Patient presents with    Chest Pain     tightness in center of chest that began around an hour ago    Acute Diaphoresis Adult     HPI  This 79-year-old male began with acute anterior chest pressure radiating down the right arm at approximately 9:30 a m  this morning  He is a smoker about a half pack per day and has had stress tests in the past from Dr Raheem Gallegos but he has never had a myocardial infarction or cardiac catheterization  Take Flomax for BPH and appraisal for GERD oxycodone for back pain at bedtime but no other significant medical or surgical problems  Prior to Admission Medications   Prescriptions Last Dose Informant Patient Reported? Taking? Omeprazole 20 MG TBEC   Yes No   Sig: Take by mouth   cholecalciferol (VITAMIN D3) 1,000 units tablet   Yes No   Sig: Take by mouth   folic acid (FOLVITE) 1 mg tablet   Yes No   Sig: Take by mouth   methotrexate 2 5 mg tablet   Yes No   Sig: Take by mouth   oxyCODONE-acetaminophen (PERCOCET) 7 5-325 MG per tablet   Yes No   Sig: Take 7 5 tablets by mouth 2 (two) times a day as needed   tamsulosin (FLOMAX) 0 4 mg   Yes No   Sig: Take by mouth      Facility-Administered Medications: None       Past Medical History:   Diagnosis Date    GERD (gastroesophageal reflux disease)     Polymyalgia ([address] )        Past Surgical History:   Procedure Laterality Date    BACK SURGERY         History reviewed  No pertinent family history  I have reviewed and agree with the history as documented  Social History   Substance Use Topics    Smoking status: Light Tobacco Smoker    Smokeless tobacco: Never Used    Alcohol use No        Review of Systems   Constitutional: Positive for diaphoresis  Negative for activity change, appetite change, chills, fever and unexpected weight change  HENT: Negative for congestion, ear pain, rhinorrhea and sore throat  Eyes: Negative for discharge and visual disturbance     Respiratory: Positive for shortness of breath  Negative for cough, chest tightness and wheezing  Cardiovascular: Positive for chest pain  Negative for palpitations and leg swelling  Chest radiating down the right arm   Gastrointestinal: Positive for nausea  Negative for abdominal pain, diarrhea and vomiting  Endocrine: Negative for polydipsia and polyuria  Genitourinary: Negative for difficulty urinating, dysuria, frequency and hematuria  Musculoskeletal: Negative for arthralgias, back pain, gait problem and myalgias  Skin: Negative for color change, pallor and rash  Neurological: Negative for dizziness, syncope, weakness, numbness and headaches  Hematological: Does not bruise/bleed easily  Psychiatric/Behavioral: Negative for agitation and dysphoric mood  The patient is not nervous/anxious  Physical Exam  Physical Exam   Constitutional: He is oriented to person, place, and time  He appears well-developed and well-nourished  No distress  Diaphoresis is mild presently but was worse before   HENT:   Head: Normocephalic and atraumatic  Right Ear: External ear normal    Left Ear: External ear normal    Mouth/Throat: Oropharynx is clear and moist  No oropharyngeal exudate  Eyes: Conjunctivae are normal  Right eye exhibits no discharge  Left eye exhibits no discharge  No scleral icterus  Neck: Normal range of motion  Neck supple  No JVD present  No thyromegaly present  Cardiovascular: Normal rate, regular rhythm and normal heart sounds  Exam reveals no gallop  No murmur heard  Pulmonary/Chest: Effort normal and breath sounds normal  He has no wheezes  He has no rales  Abdominal: Soft  Bowel sounds are normal  He exhibits no mass  There is no tenderness  Musculoskeletal: Normal range of motion  He exhibits no edema, tenderness or deformity  Lymphadenopathy:     He has no cervical adenopathy  Neurological: He is alert and oriented to person, place, and time  No sensory deficit     Speech clear face symmetrical grossly symmetrical and unremarkable muscle tone and strength   Skin: Skin is warm  Capillary refill takes less than 2 seconds  No rash noted  He is diaphoretic  No erythema  No pallor  Psychiatric: He has a normal mood and affect  His behavior is normal  Thought content normal    Nursing note and vitals reviewed  Vital Signs  ED Triage Vitals [08/08/18 0957]   Temperature Pulse Respirations BP SpO2   (!) 96 5 °F (35 8 °C) (!) 48 18 -- --      Temp Source Heart Rate Source Patient Position - Orthostatic VS BP Location FiO2 (%)   Temporal Monitor -- -- --      Pain Score       5           Vitals:    08/08/18 0957   Pulse: (!) 48       Visual Acuity      ED Medications  Medications - No data to display    Diagnostic Studies  Results Reviewed     None                 No orders to display              Procedures  Procedures       Phone Contacts  ED Phone Contact    ED Course      Pt remained stable in ED  Protocol meds initiated, discussed with Cardiologist (Dr Dami Owen) at [facility] and pt was life-flight transported there for stat Cath  He had 99% stenosis of mid R coronary with was opened and stented successfully  [facility] [place] Time    Disposition  Final diagnoses:   None     ED Disposition     None      Follow-up Information    None         Patient's Medications   Discharge Prescriptions    No medications on file     No discharge procedures on file      ED Provider  Electronically Signed by           Jorge Hampton DO  08/08/18 3203

## 2018-08-09 PROBLEM — E87.6 HYPOKALEMIA: Status: RESOLVED | Noted: 2018-08-08 | Resolved: 2018-08-09

## 2018-08-09 PROBLEM — Z72.0 TOBACCO ABUSE: Status: ACTIVE | Noted: 2018-08-09

## 2018-08-09 LAB
ALBUMIN SERPL BCP-MCNC: 3.3 G/DL (ref 3.5–5)
ALP SERPL-CCNC: 49 U/L (ref 46–116)
ALT SERPL W P-5'-P-CCNC: 39 U/L (ref 12–78)
ANION GAP SERPL CALCULATED.3IONS-SCNC: 5 MMOL/L (ref 4–13)
AST SERPL W P-5'-P-CCNC: 22 U/L (ref 5–45)
ATRIAL RATE: 49 BPM
ATRIAL RATE: 66 BPM
BASOPHILS # BLD AUTO: 0.02 THOUSANDS/ΜL (ref 0–0.1)
BASOPHILS NFR BLD AUTO: 0 % (ref 0–1)
BILIRUB SERPL-MCNC: 0.97 MG/DL (ref 0.2–1)
BUN SERPL-MCNC: 12 MG/DL (ref 5–25)
CALCIUM SERPL-MCNC: 8.3 MG/DL (ref 8.3–10.1)
CHLORIDE SERPL-SCNC: 109 MMOL/L (ref 100–108)
CHOLEST SERPL-MCNC: 147 MG/DL (ref 50–200)
CO2 SERPL-SCNC: 28 MMOL/L (ref 21–32)
CREAT SERPL-MCNC: 1.1 MG/DL (ref 0.6–1.3)
EOSINOPHIL # BLD AUTO: 0.08 THOUSAND/ΜL (ref 0–0.61)
EOSINOPHIL NFR BLD AUTO: 1 % (ref 0–6)
ERYTHROCYTE [DISTWIDTH] IN BLOOD BY AUTOMATED COUNT: 13.1 % (ref 11.6–15.1)
EST. AVERAGE GLUCOSE BLD GHB EST-MCNC: 123 MG/DL
GFR SERPL CREATININE-BSD FRML MDRD: 67 ML/MIN/1.73SQ M
GLUCOSE SERPL-MCNC: 108 MG/DL (ref 65–140)
HBA1C MFR BLD: 5.9 % (ref 4.2–6.3)
HCT VFR BLD AUTO: 37.1 % (ref 36.5–49.3)
HDLC SERPL-MCNC: 35 MG/DL (ref 40–60)
HGB BLD-MCNC: 12.5 G/DL (ref 12–17)
IMM GRANULOCYTES # BLD AUTO: 0.01 THOUSAND/UL (ref 0–0.2)
IMM GRANULOCYTES NFR BLD AUTO: 0 % (ref 0–2)
LDLC SERPL CALC-MCNC: 83 MG/DL (ref 0–100)
LYMPHOCYTES # BLD AUTO: 1.41 THOUSANDS/ΜL (ref 0.6–4.47)
LYMPHOCYTES NFR BLD AUTO: 22 % (ref 14–44)
MAGNESIUM SERPL-MCNC: 2.4 MG/DL (ref 1.6–2.6)
MCH RBC QN AUTO: 32.1 PG (ref 26.8–34.3)
MCHC RBC AUTO-ENTMCNC: 33.7 G/DL (ref 31.4–37.4)
MCV RBC AUTO: 95 FL (ref 82–98)
MONOCYTES # BLD AUTO: 0.66 THOUSAND/ΜL (ref 0.17–1.22)
MONOCYTES NFR BLD AUTO: 10 % (ref 4–12)
NEUTROPHILS # BLD AUTO: 4.27 THOUSANDS/ΜL (ref 1.85–7.62)
NEUTS SEG NFR BLD AUTO: 67 % (ref 43–75)
NRBC BLD AUTO-RTO: 0 /100 WBCS
P AXIS: 55 DEGREES
P AXIS: 66 DEGREES
PLATELET # BLD AUTO: 170 THOUSANDS/UL (ref 149–390)
PMV BLD AUTO: 10.1 FL (ref 8.9–12.7)
POTASSIUM SERPL-SCNC: 4.1 MMOL/L (ref 3.5–5.3)
PR INTERVAL: 118 MS
PR INTERVAL: 171 MS
PROT SERPL-MCNC: 5.7 G/DL (ref 6.4–8.2)
QRS AXIS: 21 DEGREES
QRS AXIS: 60 DEGREES
QRSD INTERVAL: 92 MS
QRSD INTERVAL: 92 MS
QT INTERVAL: 406 MS
QT INTERVAL: 463 MS
QTC INTERVAL: 418 MS
QTC INTERVAL: 425 MS
RBC # BLD AUTO: 3.9 MILLION/UL (ref 3.88–5.62)
SODIUM SERPL-SCNC: 142 MMOL/L (ref 136–145)
T WAVE AXIS: 106 DEGREES
T WAVE AXIS: 37 DEGREES
TRIGL SERPL-MCNC: 143 MG/DL
VENTRICULAR RATE: 49 BPM
VENTRICULAR RATE: 66 BPM
WBC # BLD AUTO: 6.45 THOUSAND/UL (ref 4.31–10.16)

## 2018-08-09 PROCEDURE — 93010 ELECTROCARDIOGRAM REPORT: CPT | Performed by: INTERNAL MEDICINE

## 2018-08-09 PROCEDURE — 85025 COMPLETE CBC W/AUTO DIFF WBC: CPT | Performed by: INTERNAL MEDICINE

## 2018-08-09 PROCEDURE — 83735 ASSAY OF MAGNESIUM: CPT | Performed by: INTERNAL MEDICINE

## 2018-08-09 PROCEDURE — 99232 SBSQ HOSP IP/OBS MODERATE 35: CPT | Performed by: INTERNAL MEDICINE

## 2018-08-09 PROCEDURE — 80053 COMPREHEN METABOLIC PANEL: CPT | Performed by: INTERNAL MEDICINE

## 2018-08-09 PROCEDURE — 93306 TTE W/DOPPLER COMPLETE: CPT | Performed by: INTERNAL MEDICINE

## 2018-08-09 PROCEDURE — 80061 LIPID PANEL: CPT | Performed by: INTERNAL MEDICINE

## 2018-08-09 PROCEDURE — 99232 SBSQ HOSP IP/OBS MODERATE 35: CPT | Performed by: PHYSICIAN ASSISTANT

## 2018-08-09 PROCEDURE — 83036 HEMOGLOBIN GLYCOSYLATED A1C: CPT | Performed by: NURSE PRACTITIONER

## 2018-08-09 RX ORDER — NICOTINE 21 MG/24HR
1 PATCH, TRANSDERMAL 24 HOURS TRANSDERMAL DAILY
Qty: 28 PATCH | Refills: 1 | Status: CANCELLED | OUTPATIENT
Start: 2018-08-10

## 2018-08-09 RX ORDER — ACETAMINOPHEN 325 MG/1
650 TABLET ORAL EVERY 6 HOURS PRN
Status: DISCONTINUED | OUTPATIENT
Start: 2018-08-09 | End: 2018-08-10 | Stop reason: HOSPADM

## 2018-08-09 RX ORDER — NICOTINE 21 MG/24HR
1 PATCH, TRANSDERMAL 24 HOURS TRANSDERMAL DAILY
Status: DISCONTINUED | OUTPATIENT
Start: 2018-08-10 | End: 2018-08-10 | Stop reason: HOSPADM

## 2018-08-09 RX ORDER — NITROGLYCERIN 0.4 MG/1
0.4 TABLET SUBLINGUAL
Status: DISCONTINUED | OUTPATIENT
Start: 2018-08-09 | End: 2018-08-10 | Stop reason: HOSPADM

## 2018-08-09 RX ORDER — NITROGLYCERIN 0.4 MG/1
0.4 TABLET SUBLINGUAL
Qty: 90 TABLET | Refills: 0 | Status: CANCELLED | OUTPATIENT
Start: 2018-08-09

## 2018-08-09 RX ORDER — ASPIRIN 81 MG/1
81 TABLET, CHEWABLE ORAL DAILY
Status: DISCONTINUED | OUTPATIENT
Start: 2018-08-10 | End: 2018-08-10 | Stop reason: HOSPADM

## 2018-08-09 RX ORDER — ONDANSETRON 2 MG/ML
4 INJECTION INTRAMUSCULAR; INTRAVENOUS EVERY 8 HOURS PRN
Status: DISCONTINUED | OUTPATIENT
Start: 2018-08-09 | End: 2018-08-10 | Stop reason: HOSPADM

## 2018-08-09 RX ORDER — HEPARIN SODIUM 5000 [USP'U]/ML
5000 INJECTION, SOLUTION INTRAVENOUS; SUBCUTANEOUS EVERY 8 HOURS SCHEDULED
Status: DISCONTINUED | OUTPATIENT
Start: 2018-08-09 | End: 2018-08-10 | Stop reason: HOSPADM

## 2018-08-09 RX ORDER — ATORVASTATIN CALCIUM 80 MG/1
80 TABLET, FILM COATED ORAL EVERY EVENING
Qty: 30 TABLET | Refills: 1 | Status: CANCELLED | OUTPATIENT
Start: 2018-08-09

## 2018-08-09 RX ORDER — ASPIRIN 81 MG/1
81 TABLET, CHEWABLE ORAL DAILY
Qty: 30 TABLET | Refills: 0 | Status: CANCELLED | OUTPATIENT
Start: 2018-08-10

## 2018-08-09 RX ORDER — FOLIC ACID 1 MG/1
1 TABLET ORAL DAILY
Status: DISCONTINUED | OUTPATIENT
Start: 2018-08-10 | End: 2018-08-10 | Stop reason: HOSPADM

## 2018-08-09 RX ORDER — PANTOPRAZOLE SODIUM 40 MG/1
40 TABLET, DELAYED RELEASE ORAL
Status: DISCONTINUED | OUTPATIENT
Start: 2018-08-10 | End: 2018-08-10 | Stop reason: HOSPADM

## 2018-08-09 RX ORDER — MELATONIN
1000 DAILY
Status: DISCONTINUED | OUTPATIENT
Start: 2018-08-10 | End: 2018-08-10 | Stop reason: HOSPADM

## 2018-08-09 RX ORDER — TAMSULOSIN HYDROCHLORIDE 0.4 MG/1
0.4 CAPSULE ORAL
Status: DISCONTINUED | OUTPATIENT
Start: 2018-08-09 | End: 2018-08-10 | Stop reason: HOSPADM

## 2018-08-09 RX ORDER — ATORVASTATIN CALCIUM 80 MG/1
80 TABLET, FILM COATED ORAL EVERY EVENING
Status: DISCONTINUED | OUTPATIENT
Start: 2018-08-09 | End: 2018-08-10 | Stop reason: HOSPADM

## 2018-08-09 RX ADMIN — TAMSULOSIN HYDROCHLORIDE 0.4 MG: 0.4 CAPSULE ORAL at 17:05

## 2018-08-09 RX ADMIN — HEPARIN SODIUM 5000 UNITS: 5000 INJECTION, SOLUTION INTRAVENOUS; SUBCUTANEOUS at 21:06

## 2018-08-09 RX ADMIN — ATORVASTATIN CALCIUM 80 MG: 80 TABLET, FILM COATED ORAL at 17:05

## 2018-08-09 RX ADMIN — PANTOPRAZOLE SODIUM 40 MG: 40 TABLET, DELAYED RELEASE ORAL at 05:48

## 2018-08-09 RX ADMIN — TICAGRELOR 90 MG: 90 TABLET ORAL at 08:47

## 2018-08-09 RX ADMIN — HEPARIN SODIUM 5000 UNITS: 5000 INJECTION, SOLUTION INTRAVENOUS; SUBCUTANEOUS at 05:47

## 2018-08-09 RX ADMIN — TICAGRELOR 90 MG: 90 TABLET ORAL at 17:05

## 2018-08-09 RX ADMIN — ASPIRIN 81 MG 81 MG: 81 TABLET ORAL at 08:47

## 2018-08-09 RX ADMIN — FOLIC ACID 1 MG: 1 TABLET ORAL at 08:47

## 2018-08-09 RX ADMIN — VITAMIN D, TAB 1000IU (100/BT) 1000 UNITS: 25 TAB at 08:47

## 2018-08-09 NOTE — PHYSICIAN ADVISOR
Current patient class: Inpatient  The patient is currently on Hospital Day: 2 at [facility]      The patient was admitted to the hospital at [phone] on 8/8/18 for the following diagnosis:  MI (myocardial infarction) ([address] ) [I21 9]       There is documentation in the medical record of an expected length of stay of at least 2 midnights  The patient is therefore expected to satisfy the 2 midnight benchmark and given the 2 midnight presumption is appropriate for INPATIENT ADMISSION  Given this expectation of a satisfying stay, CMS instructs us that the patient is most often appropriate for inpatient admission under part A provided medical necessity is documented in the chart  After review of the relevant documentation, labs, vital signs and test results, the patient is appropriate for INPATIENT ADMISSION  Admission to the hospital as an inpatient is a complex decision making process which requires the practitioner to consider the patients presenting complaint, history and physical examination and all relevant testing  With this in mind, in this case, the patient was deemed appropriate for INPATIENT ADMISSION  After review of the documentation and testing available at the time of the admission I concur with this clinical determination of medical necessity  Rationale is as follows: The patient is a 67 yrs old Male who presented to the ED at 8/8/2018 11:30 AM with a chief complaint of No chief complaint on file  The patient presented with sudden onset of chest pain that began  The patient went to the ER and was found to have ST elevation in the inferior leads  The patient was transferred to [facility]  The patient is being admitted with inferior wall ST-elevation MI  The plan of care includes PCI, statin and anti-platelet medications  This patient is appropriate for INPATIENT admission as his length of stay is expected to be at least 2 midnights   Continued hospitalization is necessary to ensure stabilization of his clinical status  The patients vitals on arrival were ED Triage Vitals   Temperature Pulse Respirations Blood Pressure SpO2   08/08/18 1900 08/08/18 1230 08/08/18 1400 08/08/18 1230 08/08/18 1400   97 7 °F (36 5 °C) (!) 54 (!) 28 118/73 99 %      Temp Source Heart Rate Source Patient Position - Orthostatic VS BP Location FiO2 (%)   08/08/18 1900 -- -- -- --   Oral          Pain Score       08/08/18 1252       No Pain           Past Medical History:   Diagnosis Date    COPD (chronic obstructive pulmonary disease) (HCC)     Coronary artery disease     GERD (gastroesophageal reflux disease)     History of transfusion     Polymyalgia (HCC)      Past Surgical History:   Procedure Laterality Date    BACK SURGERY      EYE SURGERY      SKIN BIOPSY             Consults have been placed to:   IP CONSULT TO MEDICAL CRITICAL CARE  IP CONSULT TO CASE MANAGEMENT    Vitals:    08/09/18 1100 08/09/18 1200 08/09/18 1300 08/09/18 1500   BP:    117/62   Pulse: 66 58 (!) 54 (!) 52   Resp: (!) 42 (!) 23 (!) 26 18   Temp:    98 4 °F (36 9 °C)   TempSrc:    Oral   SpO2: 96% 96% 97% 95%   Weight:           Most recent labs:    Recent Labs      08/08/18   1015   08/08/18   1845  08/09/18   0439  08/09/18   0444   WBC  6 70   --    --    --   6 45   HGB  14 7   --    --    --   12 5   HCT  41 8   --    --    --   37 1   PLT  216   < >   --    --   170   K  3 3*   < >   --   4 1   --    NA  141   < >   --   142   --    CALCIUM  9 5   < >   --   8 3   --    BUN  13   < >   --   12   --    CREATININE  1 15   < >   --   1 10   --    INR  0 97   --    --    --    --    TROPONINI  0 04*   < >  1 38*   --    --    AST  20   --    --   22   --    ALT  29   --    --   39   --    ALKPHOS  47*   --    --   49   --    BILITOT  0 60   --    --   0 97   --     < > = values in this interval not displayed         Scheduled Meds:  Current Facility-Administered Medications:  acetaminophen 650 mg Oral Q6H PRN Jose C Maldonado MD   [START ON 8/10/2018] aspirin 81 mg Oral Daily Jose C Maldonado MD   atorvastatin 80 mg Oral QPM Jose C Maldonado MD   [START ON 8/10/2018] cholecalciferol 1,000 Units Oral Daily Jose C Maldonado MD   [START ON 9/39/6430] folic acid 1 mg Oral Daily Jose C Maldonado MD   heparin (porcine) 5,000 Units Subcutaneous Q8H [facility] Jose C Maldonado MD   [START ON 8/14/2018] methotrexate 10 mg Oral Weekly Jose C Maldonado MD   [START ON 8/10/2018] nicotine 1 patch Transdermal Daily Jose C Maldonado MD   nitroglycerin 0 4 mg Sublingual Q5 Min PRN Jose C Maldonado MD   ondansetron 4 mg Intravenous Q8H PRN Jose C Maldonado MD   [START ON 8/10/2018] pantoprazole 40 mg Oral Early Morning Jose C Maldonado MD   tamsulosin 0 4 mg Oral Daily With Wandy Aparicio MD   [facility]] ticagrelor 180 mg Oral Once Isaac Moore MD   Followed by       ticagrelor 90 mg Oral BID Jose C Maldonado MD     Continuous Infusions:   PRN Meds:   acetaminophen    nitroglycerin    ondansetron    Surgical procedures (if appropriate):   * No procedures listed *

## 2018-08-09 NOTE — SOCIAL WORK
CM met with Pt with an introduction and explanation of role  Pt reported residing with his spouse in a bilevel home with no current use of DME but has a roller walker if needed and 11 steps to enter  Pt reported being independent with ADLs with no hx of VNA, SNF, mental health or drug/alcohol placements  Pt denied having a living will, uses [facility] in [place] and has Dr Jay Galicia as a PCP  CM reviewed and provided Pt with a d/c checklist  CM faxed Pt's Brilinta script [email or web address] for a [facility] check along with a 30 days free coupon  CM reviewed d/c planning process including the following: identifying help at home, patient preference for d/c planning needs, Discharge Lounge, [facility] Meds to Bed program, availability of treatment team to discuss questions or concerns patient and/or family may have regarding understanding medications and recognizing signs and symptoms once discharged  CM also encouraged patient to follow up with all recommended appointments after discharge  Patient advised of importance for patient and family to participate in managing patients medical well being

## 2018-08-09 NOTE — NURSING NOTE
Report given to next RN for transfer of care  Assist pt via ambulation to room 522 without difficulty  Reinforced call light system, within reach

## 2018-08-09 NOTE — SOCIAL WORK
Call from [email or web address] that patient can receive 30 day free trial of Brilinta but cannot qualify for $5/ month refill since he has Medicare  His copay will be $100/month and patient reports he cannot afford this  CM informed Amol Wallace who will discuss with MD     Informed by Amol Wallace that MD wants patient to receive 30 days free Brilinta with no refills and patient was informed  Script is in [email or web address] and informed Saurabh Smith of above

## 2018-08-09 NOTE — PLAN OF CARE
Problem: DISCHARGE PLANNING - CARE MANAGEMENT  Goal: Discharge to post-acute care or home with appropriate resources  INTERVENTIONS:  - Conduct assessment to determine patient/family and health care team treatment goals, and need for post-acute services based on payer coverage, community resources, and patient preferences, and barriers to discharge  - Address psychosocial, clinical, and financial barriers to discharge as identified in assessment in conjunction with the patient/family and health care team  - Arrange appropriate level of post-acute services according to patient's   needs and preference and payer coverage in collaboration with the physician and health care team  - Communicate with and update the patient/family, physician, and health care team regarding progress on the discharge plan  - Arrange appropriate transportation to post-acute venues  - CM will completed a price check on Pt's Brilinta prescription   Outcome: Progressing

## 2018-08-09 NOTE — PROGRESS NOTES
Progress Note - Critical Care   Edith Palencia 67 y o  male MRN: [id number]  Unit/Bed#: [facility] 513-01 Encounter: [id number]    Impression:  Principal Problem:    Acute ST elevation myocardial infarction (STEMI) of inferior wall (HCC)  Active Problems:    S/P drug eluting coronary stent placement    Hypokalemia    Plan:    Neuro:   · Pain controlled with: Tylentol prn  · Regulate sleep/wake cycle  · Trend neuro exam  CV:   · ASA/Brilinta  · Lipitor  · F/u Echo  · Rhythm: Sinus Bradycardia  · Follow rhythm on telemetry  Lung:   · Pulmonary toileting and IS  · SpO2 goal >92%  GI:   · Stress ulcer prophylaxis: Protonix PO  FEN:   · Nutrition/diet plan: Cardiac diet  · Replete electrolytes with goals: K >4 0, Mag >2 0, and Phos >3 0  :   · Indwelling Hanna present: no   · Trend UOP and BUN/creat  · Strict I and O  · Flomax  ID:   · Trend temps and WBC count  · Maintain normothermia  Heme:   · Trend hgb and plts  · Transfuse as needed for goal hgb >7 0  · SQH/SCDs  Endo:   · Methotrexate weekly (Tuesdays)  · Glycemic control plan: Blood glucose controlled on current regimen  MSK/Skin:  · Mobility goal: Out of bed  · Frequent turning and pressure off-loading  · Local wound care as needed  VTE Prophylaxis:  · Pharmacologic Prophylaxis: Heparin  · Mechanical Prophylaxis: sequential compression device    Disposition: Transfer to telemetry    Treatment Team: Cardiology, CC    Reason for Admission: STEMI s/p stenting of RCA    HPI/24hr events: No acute events overnight    Physical Exam:    General Appearance:  No acute distress, sitting in bedside chair    HENT: Normocephalic without obvious abnormality    Eyes: No icterus    Cardiac:  Bradycardia and regular rhythm    Pulmonary:  Clear to auscultation bilaterally    Gastrointestinal: Soft, no distention, nontender     : Hanna present: no             Musculoskeletal: No edema bilaterally       Neuro/Psych:  Alert and oriented x3    Skin:  Warm and dry    Vitals:   Vitals: 18 0300 18 0400 18 0500 18 0600   BP: 114/59 104/53 110/59 120/63   Pulse: (!) 50 (!) 46 (!) 48 (!) 50   Resp:    Temp:       TempSrc:       SpO2: 98% 97% 97% 96%   Weight:    94 8 kg (208 lb 14 4 oz)             Temperature: Temp (24hrs), Av 4 °F (36 3 °C), Min:96 5 °F (35 8 °C), Max:97 7 °F (36 5 °C)  Current: Temperature: 97 7 °F (36 5 °C)    Weights: IBW: -88 kg  Body mass index is 29 97 kg/m²  Respiratory:  SpO2: SpO2: 95 %       Intake and Outputs:  Intake/Output Summary (Last 24 hours) at 18 0703  Last data filed at 18 0445   Gross per 24 hour   Intake             1660 ml   Output             1500 ml   Net              160 ml     I/O last 24 hours: In: 200 [P O :660; I V :950; IV Piggyback:50]  Out: 1500 [Urine:1500]    UOP: 63cc/hour   Stool:      Nutrition:        Diet Orders            Start     Ordered    18 1135  Diet Cardiac; Cardiac TLC 2 3 GM NA  Diet effective now     Question Answer Comment   Diet Type Cardiac    Cardiac Cardiac TLC 2 3 GM NA    RD to adjust diet per protocol?  Yes        18 1137          Labs:     Results from last 7 days  Lab Units 18  0444 18  1244 18  1015   WBC Thousand/uL 6 45  --  6 70   HEMOGLOBIN g/dL 12 5  --  14 7   HEMATOCRIT % 37 1  --  41 8   PLATELETS Thousands/uL 170 166 216   NEUTROS PCT % 67  --  40*   MONOS PCT % 10  --  11       Results from last 7 days  Lab Units 18  0439 18  1244 18  1015   SODIUM mmol/L 142 139 141   POTASSIUM mmol/L 4 1 4 2 3 3*   CHLORIDE mmol/L 109* 107 105   CO2 mmol/L 28 25 27   BUN mg/dL 12 12 13   CREATININE mg/dL 1 10 1 04 1 15   CALCIUM mg/dL 8 3 8 4 9 5   TOTAL PROTEIN g/dL 5 7*  --  6 4   BILIRUBIN TOTAL mg/dL 0 97  --  0 60   ALK PHOS U/L 49  --  47*   ALT U/L 39  --  29   AST U/L 22  --  20   GLUCOSE RANDOM mg/dL 108 116 96       Results from last 7 days  Lab Units 18  0439 18  1244 18  1015   MAGNESIUM mg/dL 2  4 1 8 1 8*            Results from last 7 days  Lab Units 18  1015   INR  0 97   PTT seconds 29           Results from last 7 days  Lab Units 18  1845 18  1608 18  1244 18  1015   TROPONIN I ng/mL 1 38* 1 05* 0 37* 0 04*           Results from last 7 days  Lab Units 18  0439   HEMOGLOBIN A1C % 5 9           Imagin/8 ECHO:  Pending official read       Allergies: No Known Allergies    Medications:   Scheduled Meds:  Current Facility-Administered Medications:  acetaminophen 650 mg Oral Q6H PRN Gabbie Del Real MD   aspirin 81 mg Oral Daily Rai Mukherjee MD   atorvastatin 80 mg Oral QPM Gabbie Del Real MD   cholecalciferol 1,000 Units Oral Daily Félix English, CRNP   folic acid 1 mg Oral Daily Félix English, CRNP   heparin (porcine) 5,000 Units Subcutaneous Q8H [address] Gabbie Del Real MD   [START ON 2018] methotrexate 10 mg Oral Weekly Félix , MARLENE   nicotine 1 patch Transdermal Daily Gabbie Del Real MD   nitroglycerin 0 4 mg Sublingual Q5 Min PRN Gabbie Del Real MD   ondansetron 4 mg Intravenous Q6H PRN Gabbie Del Real MD   ondansetron 4 mg Intravenous Q6H PRN Eva Salazar PA-C   pantoprazole 40 mg Oral Early Morning Félix English, CRNP   tamsulosin 0 4 mg Oral Daily With Dinner Félix English, MARLENE   ticagrelor 180 mg Oral Once Gabbie Del Real MD   Followed by       ticagrelor 90 mg Oral BID MARLENE Reyes     Continuous Infusions:   PRN Meds:    acetaminophen 650 mg Q6H PRN   nitroglycerin 0 4 mg Q5 Min PRN   ondansetron 4 mg Q6H PRN   ondansetron 4 mg Q6H PRN       Invasive lines and devices: Invasive Devices     Peripheral Intravenous Line            Peripheral IV 18 Right Antecubital 1 day    Peripheral IV 18 Right Wrist 1 day                Code Status: Level 1 - Full Code    Counseling / Coordination of Care  Total Critical Care time spent 0 minutes excluding procedures, teaching and family updates        SIGNATURE: Christie Vásquez Kandy Alegre  DATE: August 9, 2018  TIME: 7:03 AM

## 2018-08-09 NOTE — PROGRESS NOTES
08/09/18 [address] Involvement Patient not active with [employer]   Spiritual Beliefs/Perceptions   Support Systems Spouse/significant other;Family members   Coping Responses   Patient Coping Open/discussion   Patient Spiritual Assessment   Feelings of Guilt/Regret Pt expressed some survivors guilt   Plan of Care   Comments Cultivated relationship of care and support; provided encouragement and blessing; explored current support network and spiritual coping; pt identified some survivors guilt and discussed coping and support; validated expeirences and provided reframing; provided educaiton about pastoral care services   Assessment Completed by: Unit visit

## 2018-08-09 NOTE — DISCHARGE SUMMARY
Discharge Summary - Venkat Garvey 67 y o  male MRN: [id number]    [facility] Encounter: [id number]    Admission Date:   Admission Orders     Ordered        08/08/18 1137  Inpatient Admission  Once               Admitting Diagnosis:  Acute inferior wall STEMI    HPI & Summary of Hospital Course  Patient is a 77-year-old gentleman with a past medical history of current tobacco abuse, GERD, obstructive sleep apnea, and polymyalgia rheumatica    On 08/08/2018 the patient presented to the ED at [facility] with c/o severe and persistent substernal chest pain  On further workup, a 12 lead EKG revealed an inferior wall STEMI with reciprocal changes in the lateral leads  Initial troponin's 0 04/0 37  He was loaded w/ 325 mg of aspirin, 180 mg of Brilinta, and 5,000 units of heparin  The Patient emergently transferred to [facility] cardiac cath lab for PCI  The cath procedure revealed a 99% lesion in the mid RCA  A Xience [address] Rx 3 5 x 23mm drug-eluting stent was placed across the lesion; deemed as the culprit of the patient's STEMI per Dr Anisa Amos, who performed the procedure  Patient was transferred to ICU for close hemodynamic monitoring  He has had persistent bradycardia since his cardiac catheterization procedure and initiation of beta blocking agent was on hold secondary to this  Telemetry monitoring has not revealed any ectopic beats, sustained arrhythmias, or high-grade heart block  Patient has remained otherwise hemodynamically stable in the intensive care unit and was transferred out to [facility] telemetry on 08/09/2018  He is free from complaints of chest pain, palpitations,or shortness of breath  Patient had been started on aspirin 81 mg daily, Brilinta 90 mg b i d , high intensity statin therapy with atorvastatin 80 mg daily and will continue on discharge   The patient will not be discharged on a beta-blocker secondary to persistent asymptomatic bradycardia with rates in the 40's at night and low 50's during the day  Beta-blocker to be addressed at f/u visit with Dr Lorena Moran next week  2D echocardiogram from 08/08/2018 revealed LVEF 60%; severe hypokinesis of the basal inferior wall; the RV normal size and systolic function, LA and RA normal, the aortic valve trileaflet, no evidence for stenosis or regurgitation no evidence of mitral valve stenosis regurgitation, trace TR, no significant regurgitation of the pulmonic valve    No indication for ACEI on discharge; LVEF 60%    During this admission, patient had a hemoglobin A1c of 5 9, lipid profile A1c (T chol 147, trig 143, HDL 35, LDL 83)    The patient has a follow-up appointment scheduled with Dr Lorena Moran on 8/15/2018 at 3:30 pm; I have ordered a BMP and CBC with platelet script for the patient to receive in 1 week, with results sent to Dr Lorena Moran in the office  He will receive outpatient cardiac rehab which has been ordered for the patient upon discharge  I did discuss the cost of Brilinta going forward with case management, I was notified that the patient's co-pay would be $100 per month  The patient stated he could not afford this cost  I have provided case management with a script for a 30 day supply of Brilinta 90 mg b i d  in which the patient will receive for free  I discussed this with Dr Vincent Campuzano, who agreed that the patient could continue on Brilinta for 1 month and then be transitioned to clopidogrel after the 1st month  I am requesting that once the patient is evaluated in the office by Dr Lorena Moran, the patient should be switched over to clopidogrel after his 1st month of Brilinta for cost issues      Significant Findings, Care, Treatment and Services Provided:     Procedures Performed:   Cardiac Catheterization: 08/08/2018  [address]  [facility] [place] [address] [facility]  [phone]     [facility]     Invasive Cardiovascular Lab Complete Report     Patient: Florence Cutler  MR number: [id number]  Account number: [de-identified]  Study date: 2018  Gender: Male  : 1945  Height:  Weight: 199 5 lb  BSA:     Allergies: NO KNOWN ALLERGIES     Diagnostic Cardiologist:  Karen Dodson MD  Interventional Cardiologist:  Karen Dodson MD  Primary Physician:  Andres Pineda MD     SUMMARY     CORONARY CIRCULATION:  Left main: Normal   LAD: The vessel was normal sized  There was minimal plaque  There were no significant lesions  There was moderate systolic compression in mid vessel with no compromise of flow  Circumflex: The vessel was normal sized  There were two OM branches  There were no significant lesions  RCA: The vessel was normal sized and dominant, giving rise to the PDA and a posterolateral branch  There was a 99% stenosis in mid vessel  This was the culprit for the patient's STEMI        1ST LESION INTERVENTIONS:  Following pre-dilation, a Xience Vickie Rx 3 5 x 23mm drug-eluting stent was placed across the 99% lesion in mid RCA and deployed at a maximum inflation pressure of 16 alma  After post-dilation with a 3 75mm balloon, there was no residual  stenosis, and distal runoff was normal      Summary: Right radial access was employed  Single vessel CAD, with a 99% mid RCA representing the culprit for the patient's inferior STEMI  Successful primary PCI, mid RCA, with a reduction in stenosis from 99% to 0%      Plan: smoking cessation, DAPT, statin therapy      INDICATIONS:  --  Possible CAD: myocardial infarction with ST elevation (STEMI)      PROCEDURES PERFORMED     --  Right coronary angiography  --  Left coronary angiography  --  Acute Myocardial Infarct  --  Mod Sedation Same Physician Initial 15min  --  Mod Sedation Same Physician Add 15min  --  Coronary Catheterization (w/o [facility])  --  AMI PCI (ALVIN, PTCRA, PTCA) Single    --  Intervention on mid RCA: balloon angioplasty      PROCEDURE: The risks and alternatives of the procedures and conscious sedation were explained to the patient and informed consent was obtained  The patient was brought to the cath lab and placed on the table  The planned puncture sites  were prepped and draped in the usual sterile fashion      --  Right radial artery access  After performing an Tobin's test to verify adequate ulnar artery supply to the hand, the radial site was prepped  The puncture site was infiltrated with local anesthetic  The vessel was accessed using the  modified Seldinger technique, a wire was advanced into the vessel, and a sheath was advanced over the wire into the vessel      --  Right coronary artery angiography  A catheter was advanced over a guidewire into the aorta and positioned in the right coronary artery ostium under fluoroscopic guidance  Angiography was performed      --  Left coronary artery angiography  A catheter was advanced over a guidewire into the aorta and positioned in the left coronary artery ostium under fluoroscopic guidance  Angiography was performed      --  Acute Myocardial Infarct      --  Mod Sedation Same Physician Initial 15min      --  Mod Sedation Same Physician Add 15min      --  Coronary Catheterization (w/o [facility])      LESION INTERVENTION: PCI was performed on the lesion in the mid RCA  GURWINDER flow was grade 3 before and after PCI  There was no dissection      --  Vessel setup was performed  A 6Fr  Launcher AL1 SH guiding catheter was used to cannulate the vessel      --  Vessel setup was performed  A BMW J 190cm wire was used to cross the lesion      --  Balloon angioplasty was performed, using a Trek Rx 3 0 x 15mm balloon, with 4 inflations and a maximum inflation pressure of 12 alma      --  Following pre-dilation, a Xience Vickie Rx 3 5 x 23mm drug-eluting stent was placed across the 99% lesion in mid RCA and deployed at a maximum inflation pressure of 16 alma   After post-dilation with a 3 75mm balloon, there was no  residual stenosis, and distal runoff was normal      --  Balloon angioplasty was performed, using a NC Trek Rx 3 75 x 20mm balloon, with 2 inflations and a maximum inflation pressure of 18 alma      INTERVENTIONS:  --  AMI PCI (ALVIN, PTCRA, PTCA) Single      PROCEDURE COMPLETION: The patient tolerated the procedure well and was discharged from the cath lab  TIMING: Test started at 11:18  Test concluded at 11:50  HEMOSTASIS: The sheath was removed  The site was compressed with a Hemoband  device  Hemostasis was obtained  MEDICATIONS GIVEN: Verapamil (Isoptin, Calan, Covera), 1 25 mg, IA, at 11:24  Versed (2mg/2ml), 2 mg, IV, at 11:19  Fentanyl (1OOmcg/2 ml), 50 mcg, IV, at 11:19  1% Lidocaine, 0 1 ml, subcutaneously, at  11:22  Nitroglycerin (200mcg/ml), 200 mcg, at 11:24  Heparin 1000 units/ml, 4,000 units, IV, at 11:24  Heparin 1000 units/ml, 5,000 units, IV, at 11:31  Nitroglycerine (200mcg/ml), 200 mcg, at 11:38  Nitroglycerine (200mcg/ml), 200 mcg, at  11:38  CONTRAST GIVEN: 100 ml Omnipaque (350 mg I /ml)  RADIATION EXPOSURE: Fluoroscopy time: 10 55 min      CORONARY VESSELS:   --  Left main: Normal   --  LAD: The vessel was normal sized  There was minimal plaque  There were no significant lesions  There was moderate systolic compression in mid vessel with no compromise of flow  --  Circumflex: The vessel was normal sized  There were two OM branches  There were no significant lesions  --  RCA: The vessel was normal sized and dominant, giving rise to the PDA and a posterolateral branch  There was a 99% stenosis in mid vessel  This was the culprit for the patient's STEMI  Sal Hamilton --  Mid RCA:     Prepared and signed by  Joe Coy MD  Signed 08/08/2018 12:03:14     Study diagram     Intervention results  Native coronary lesions:  · balloon angioplasty of mid RCA   Stent: Bridget Iniguez Rx 3 5 x 23mm drug-eluting      Hemodynamic tables     Pressures:  Baseline  Pressures:  - HR: 65  Pressures:  - Rhythm:  Pressures:  -- Aortic Pressure (S/D/M): 126/56/81     Outputs:  Baseline  Outputs:  -- CALCULATIONS: [age]  Outputs:  -- CALCULATIONS: Sex: Male  Outputs:  -- CALCULATIONS: Weight in k 70    Imaging  2D Echocardiogram: 2018  LV EF 60%; severe hypokinesis of the basal inferior wall(s); RV size systolic function was normal, LA and RA size was normal; trileaflet aortic valve, no evidence for stenosis or regurgitation; no evidence for mitral valve stenosis or regurgitation, trace TR, estimated peak PA pressure was 27 mmHg; there was no pericardial effusion    Complications:  None    Discharge Diagnosis:   Acute inferior STEMI    Resolved Problems  Date Reviewed: 2018          Resolved    Hypokalemia 2018     Resolved by  MARLENE Cameron        ROS:  Negative for chest pain, palpitations, shortness of breath, dizziness, lightheadedness, pre-syncope or syncope; complains of fatigue    Physical Exam   Constitutional: He is oriented to person, place, and time  He appears well-developed and well-nourished  No distress  HENT:   Head: Normocephalic and atraumatic  Mouth/Throat: Oropharynx is clear and moist  No oropharyngeal exudate  Eyes: Conjunctivae are normal  Pupils are equal, round, and reactive to light  No scleral icterus  Neck: Normal range of motion  Neck supple  No JVD present  Cardiovascular: Regular rhythm, S1 normal, S2 normal and intact distal pulses  Bradycardia present  Exam reveals no gallop and no friction rub  No murmur heard  Pulses:       Radial pulses are 2+ on the right side, and 2+ on the left side  Dorsalis pedis pulses are 2+ on the right side, and 2+ on the left side  Pulmonary/Chest: Effort normal and breath sounds normal  No respiratory distress  He has no wheezes  He has no rales  Abdominal: Soft  Bowel sounds are normal  He exhibits no distension  There is no tenderness  Musculoskeletal: Normal range of motion  He exhibits no edema or deformity  Lymphadenopathy:     He has no cervical adenopathy  Neurological: He is alert and oriented to person, place, and time  Skin: Skin is warm and dry  He is not diaphoretic  Right radial puncture site is soft; bruised, no hematoma; band aid intact  Psychiatric: He has a normal mood and affect  Condition at Discharge: good     Discharge instructions/Information to patient and family:   See after visit summary for information provided to patient and family  Provisions for Follow-Up Care:  See after visit summary for information related to follow-up care and any pertinent home health orders  PCP: Reuben Rivera DO    Disposition: Home    Planned Readmission: No    Discharge Statement   I spent 60 minutes discharging the patient  This time was spent on the day of discharge  I had direct contact with the patient on the day of discharge  Additional documentation is required if more than 30 minutes were spent on discharge  Discharge Medications:  See after visit summary for reconciled discharge medications provided to patient and family

## 2018-08-09 NOTE — DISCHARGE INSTRUCTIONS
1  Please see the post cardiac catheterization/ angioseal closure device instructions  No heavy lifting, greater than 10 lbs  or strenuous  activity for 48 hrs  See the post cardiac catheterization/ angioseal closure device instructions  2 Remove band aid tomorrow  Shower and wash area- wrist gently with soap and water- beginning tomorrow  Rinse and pat dry  Apply new water seal band aid  Repeat this process for 5 days  No powders, creams lotions or antibiotic ointments  for 5 days  No tub baths, hot tubs or swimming for 5 days  3 No driving for 3 days    4  Do not stop aspirin or Brillinta (ticagrelor) for any reason without a cardiologists consent, or the stent could block up and cause a heart attack  Ticagrelor (By mouth)   Ticagrelor ([id number])  Helps prevent stroke, heart attack, and other heart problems  This medicine is a blood thinner  Brand Name(s): Brilinta   There may be other brand names for this medicine  When This Medicine Should Not Be Used: This medicine is not right for everyone  Do not use it if you had an allergic reaction to ticagrelor, or if you have bleeding problems (such as a bleeding stomach ulcer) or a history of bleeding in your brain  How to Use This Medicine:   Tablet  · Your doctor will tell you how much medicine to use  Do not use more than directed  Take this medicine at the same time every day  · Your doctor may tell you to take aspirin with this medicine  Do not use more than 100 milligrams of aspirin per day  Check the labels of other medicines to make sure they do not contain aspirin  · If you cannot swallow the tablet, you may do this:   ¨ Crush the tablet and mix it in a glass of water  Drink it right away  Rinse the glass with more water and drink that too, so you get all the medicine  ¨ You may give the tablet and water mixture through a nasogastric tube  Flush the tube with more water so you receive all the medicine    · This medicine should come with a Medication Guide  Ask your pharmacist for a copy if you do not have one  · Missed dose: Skip the missed dose and take your next dose as usual  Do not take extra medicine to make up for a missed dose  · Store the medicine in a closed container at room temperature, away from heat, moisture, and direct light  Drugs and Foods to Avoid:   Ask your doctor or pharmacist before using any other medicine, including over-the-counter medicines, vitamins, and herbal products  · Some medicines can affect how ticagrelor works  Tell your doctor if you are using any of the following:  ¨ Atazanavir, carbamazepine, clarithromycin, digoxin, indinavir, itraconazole, ketoconazole, lovastatin, nefazodone, nelfinavir, phenobarbital, phenytoin, rifampin, ritonavir, saquinavir, simvastatin, telithromycin, or voriconazole  ¨ Blood thinner (including warfarin or heparin)  ¨ NSAID pain or arthritis medicine (including celecoxib, diclofenac, ibuprofen, naproxen)  Warnings While Using This Medicine:   · Tell your doctor if you are pregnant or breastfeeding, or if you have liver disease, heart rhythm problems (including slow heartbeat), lung or breathing problems (such as asthma or COPD), or a history of bleeding problems  · This medicine may cause you to bleed and bruise more easily, and it may take longer than usual for bleeding to stop  Be careful to avoid injuries  · Do not stop using this medicine unless your doctor tells you to  To stop it may increase your risk of a heart attack, blood clot, or other serious problem  · Tell any doctor or dentist who treats you that you are using this medicine  With your doctor's permission, you may need to stop using this medicine several days before you have surgery to reduce the risk of bleeding problems  Follow your doctor's instructions carefully  · Your doctor will do lab tests at regular visits to check on the effects of this medicine  Keep all appointments    · Keep all medicine out of the reach of children  Never share your medicine with anyone  Possible Side Effects While Using This Medicine:   Call your doctor right away if you notice any of these side effects:  · Allergic reaction: Itching or hives, swelling in your face or hands, swelling or tingling in your mouth or throat, chest tightness, trouble breathing  · Bloody or black, tarry stools, red or dark brown urine  · Fast, slow, or pounding heartbeat  · Trouble breathing  · Unusual bleeding, bruising, or weakness  · Vomiting of blood or material that looks like coffee grounds  If you notice other side effects that you think are caused by this medicine, tell your doctor  Call your doctor for medical advice about side effects  You may report side effects to FDA at 0-631-EVA-3682  © 2017 [facility] [place] Information is for End User's use only and may not be sold, redistributed or otherwise used for commercial purposes  The above information is an  only  It is not intended as medical advice for individual conditions or treatments  Talk to your doctor, nurse or pharmacist before following any medical regimen to see if it is safe and effective for you  Myocardial Infarction   WHAT YOU NEED TO KNOW:   A myocardial infarction (MI) is a heart attack  A heart attack happens when the blood vessels that supply blood to your heart (coronary arteries) are blocked  This can damage your heart  It can lead to an abnormal heart rhythm, heart failure, or may become life-threatening  DISCHARGE INSTRUCTIONS:   Medicines: You may  need any of the following:  · Heart medicines  help decrease blood pressure, control your heart rate, and help your heart function better  · Nitroglycerin  opens the arteries to your heart, increases oxygen levels, and can decrease chest pain  You may get your nitroglycerin as a pill, a patch, or a paste   Ask your healthcare provider or cardiologist how to safely take this medicine  · Aspirin  helps prevent clots from forming and causing blood flow problems  If healthcare providers want you to take aspirin daily, do not take acetaminophen or ibuprofen instead  Do not take more or less aspirin than healthcare providers say to take  If you are on another blood thinner medicine, ask your healthcare provider or cardiologist before you take aspirin for any reason  · Blood thinners    help prevent blood clots  Examples of blood thinners include heparin and warfarin  Clots can cause strokes, heart attacks, and death  The following are general safety guidelines to follow while you are taking a blood thinner:    ¨ Watch for bleeding and bruising while you take blood thinners  Watch for bleeding from your gums or nose  Watch for blood in your urine and bowel movements  Use a soft washcloth on your skin, and a soft toothbrush to brush your teeth  This can keep your skin and gums from bleeding  If you shave, use an electric shaver  Do not play contact sports  ¨ Tell your dentist and other healthcare providers that you take anticoagulants  Wear a bracelet or necklace that says you take this medicine  ¨ Do not start or stop any medicines unless your healthcare provider tells you to  Many medicines cannot be used with blood thinners  ¨ Tell your healthcare provider right away if you forget to take the medicine, or if you take too much  ¨ Warfarin  is a blood thinner that you may need to take  The following are things you should be aware of if you take warfarin  § Foods and medicines can affect the amount of warfarin in your blood  Do not make major changes to your diet while you take warfarin  Warfarin works best when you eat about the same amount of vitamin K every day  Vitamin K is found in green leafy vegetables and certain other foods  Ask for more information about what to eat when you are taking warfarin      § You will need to see your healthcare provider for follow-up visits when you are on warfarin  You will need regular blood tests  These tests are used to decide how much medicine you need  · Cholesterol medicine  decreases cholesterol and the amount of plaque in your blood  · Do not take certain medicines without asking your healthcare provider first   These include NSAIDs, herbal or vitamin supplements, or hormones (estrogen or progestin)  · Take your medicine as directed  Contact your healthcare provider if you think your medicine is not helping or if you have side effects  Tell him or her if you are allergic to any medicine  Keep a list of the medicines, vitamins, and herbs you take  Include the amounts, and when and why you take them  Bring the list or the pill bottles to follow-up visits  Carry your medicine list with you in case of an emergency  Cardiac rehabilitation (rehab)  is a program run by specialists who will help you safely strengthen your heart and prevent more heart disease  The plan includes exercise, relaxation, stress management, and heart-healthy nutrition  Healthcare providers will also check to make sure any medicines you take are working  The plan may also include instructions for when you can drive, return to work, and do other normal daily activities  Follow up with your healthcare provider or cardiologist within 14 days or as directed:  Ask for information about continuing care, treatments, and home services  Write down your questions so you remember to ask them during your visits  Lifestyle changes:   · Go to cardiac rehabilitation as directed  This is a program run by specialists who will help you safely strengthen your heart and prevent more heart disease  This plan includes exercise, relaxation, stress management, and heart-healthy nutrition  Healthcare providers will also check to make sure any medicines you are taking are working   The plan may also include instructions for when you can drive, return to work, and do other normal daily activities  · Eat a heart healthy diet  Get enough calories, protein, vitamins, and minerals to help prevent poor nutrition and promote muscle strength  You may be told to eat foods low in cholesterol or sodium (salt)  You also may be told to limit saturated and trans fats  Eat foods that contain healthy fats, such as walnuts, salmon, and canola and soybean oils  Eat foods that help protect the heart, including plenty of fruits and vegetables, nuts, and sources of fiber  · Do not smoke  If you smoke, it is never too late to quit  Smoking increases your risk of another MI      · Exercise  Ask your healthcare provider or cardiologist about the best exercise plan for you  Exercise makes your heart stronger, lowers blood pressure, and helps prevent an MI  The goal is 30 to 60 minutes a day, 5 to 7 days a week  Ask your healthcare provider or cardiologist how often and how long to exercise  · Maintain a healthy weight  Ask your healthcare provider or cardiologist how much you should weigh  Ask him to help you create a weight loss plan if you are overweight  · Manage your stress  Stress may slow healing and lead to illness  Learn ways to control stress, such as relaxation, deep breathing, and music  Talk to someone about things that upset you  · Get a flu vaccine  every year as soon as it is available  The vaccine will help prevent the flu  Ask about other vaccinations you may need  Contact your healthcare provider or cardiologist if:   · You have trouble taking your heart medicine  · You have questions or concerns about your condition or care    Seek care immediately or call 911 if:   · You have any of the following signs of a heart attack:      ¨ Squeezing, pressure, or pain in your chest that lasts longer than 5 minutes or returns    ¨ Discomfort or pain in your back, neck, jaw, stomach, or arm     ¨ Trouble breathing    ¨ Nausea or vomiting    ¨ Lightheadedness or a sudden cold sweat, especially with chest pain or trouble breathing    · You are tired and cannot think clearly  · Your heart is beating faster than usual      · You are bleeding from your gums or nose  · You see blood in your urine or bowel movements  · You urinate less than usual or not at all  · You have new or increased swelling in your feet or ankles  © 2017 [address] [employer] Information is for End User's use only and may not be sold, redistributed or otherwise used for commercial purposes  All illustrations and images included in CareNotes® are the copyrighted property of [employer]  or [employer]  The above information is an  only  It is not intended as medical advice for individual conditions or treatments  Talk to your doctor, nurse or pharmacist before following any medical regimen to see if it is safe and effective for you  Cigarette Smoking and Your Health   WHAT YOU NEED TO KNOW:   What are the risks to my health if I smoke tobacco?  Nicotine and other chemicals found in tobacco damage every cell in your body  Even if you are a light smoker, you have an increased risk for cancer, heart disease, and lung disease  If you are pregnant or have diabetes, smoking increases your risk for complications  What are the benefits to my health if I stop smoking? · You decrease respiratory symptoms such as coughing, wheezing, and shortness of breath  · You reduce your risk for cancers of the lung, mouth, throat, kidney, bladder, pancreas, stomach, and cervix  If you already have cancer, you increase the benefits of chemotherapy  You also reduce your risk for cancer returning or a second cancer from developing  · You reduce your risk for heart disease, blood clots, heart attack, and stroke  · You reduce your risk for lung infections, and diseases such as pneumonia, asthma, chronic bronchitis, and emphysema  · Your circulation improves   More oxygen can be delivered to your body  If you have diabetes, you lower your risk for complications, such as kidney, artery, and eye diseases  You also lower your risk for nerve damage  Nerve damage can lead to amputations, poor vision, and blindness  · You improve your body's ability to heal and to fight infections  What are the health benefits to others if I stop smoking? Tobacco is harmful to nonsmokers who breathe in your secondhand smoke  The following are ways the health of others around you may improve when you stop smoking:  · You lower the risks for lung cancer and heart disease in nonsmoking adults  · If you are pregnant, you lower the risk for miscarriage, early delivery, low birth weight, and stillbirth  You also lower your baby's risk for SIDS, obesity, developmental delay, and neurobehavioral problems, such as ADHD  · If you have children, you lower their risk for ear infections, colds, pneumonia, bronchitis, and asthma  Where can I find more information and support to stop smoking? · [facility]  Phone: 6- 866 - 831-1787  Web Address: www [employer]  CARE AGREEMENT:   You have the right to help plan your care  Learn about your health condition and how it may be treated  Discuss treatment options with your caregivers to decide what care you want to receive  You always have the right to refuse treatment  The above information is an  only  It is not intended as medical advice for individual conditions or treatments  Talk to your doctor, nurse or pharmacist before following any medical regimen to see if it is safe and effective for you  © 2017 [address] [employer] Information is for End User's use only and may not be sold, redistributed or otherwise used for commercial purposes  All illustrations and images included in CareNotes® are the copyrighted property of [employer]  or [employer]      How to Stop Smoking   WHAT YOU NEED TO KNOW:   You will improve your health and the health of others around you if you stop smoking  Your risk for heart and lung disease, cancer, stroke, heart attack, and vision problems will also decrease  You can benefit from quitting no matter how long you have smoked  DISCHARGE INSTRUCTIONS:   Prepare to stop smoking:  Nicotine is a highly addictive drug found in cigarettes  Withdrawal symptoms can happen when you stop smoking and make it hard to quit  These include anxiety, depression, irritability, trouble sleeping, and increased appetite  You increase your chances of success if you prepare to quit  · Set a quit date  [place] a date that is within the next 2 weeks  Do not pick a day that you think may be stressful or busy  Write down the day or [place] it on your calender  · Tell friends and family that you plan to quit  Explain that you may have withdrawal symptoms when you try to quit  Ask them to support you  They may be able to encourage you and help reduce your stress to make it easier for you to quit  · Make a list of your reasons for quitting  Put the list somewhere you will see it every day, such as your refrigerator  You can look at the list when you have a craving  · Remove all tobacco and nicotine products from your home, car, and workplace  Also, remove anything else that will tempt you to smoke, such as lighters, matches, or ashtrays  Clean your car, home, and places at work that smell like smoke  The smell of smoke can trigger a craving  · Identify triggers that make you want to smoke  This may include activities, feelings, or people  Also write down 1 way you can deal with each of your triggers  For example, if you want to smoke as soon as you wake up, plan another activity during this time, such as exercise  · Make a plan for how you will quit  Learn about the tools that can help you quit, such as medicine, counseling, or nicotine replacement therapy   Choose at least 2 options to help you quit   Tools to help you stop smoking:   · Counseling  from a trained healthcare provider can provide you with support and skills to quit smoking  The provider will also teach you to manage your withdrawal symptoms and cravings  You may receive counseling from one counselor, in group therapy, or through phone therapy called a quit line  · Nicotine replacement therapy (NRT)  such as nicotine patches, gum, or lozenges may help reduce your nicotine cravings  You may get these without a doctor's order  Do not use e-cigarettes or smokeless tobacco in place of cigarettes or to help you quit  They still contain nicotine  · Prescription medicines  such as nasal sprays or nicotine inhalers may help reduce your withdrawal symptoms  Other medicines may also be used to reduce your urge to smoke  Ask your healthcare provider about these medicines  You may need to start certain medicines 2 weeks before your quit date for them to work well  · Hypnosis  is a practice that helps guide you through thoughts and feelings  Hypnosis may help decrease your cravings and make you more willing to quit  · Acupuncture therapy  uses very thin needles to balance energy channels in the body  This is thought to help decrease cravings and symptoms of nicotine withdrawal      · Support groups  let you talk to others who are trying to quit or have already quit  It may be helpful to speak with others about how they quit  Manage your cravings:   · Avoid situations, people, and places that tempt you to smoke  Go to nonsmoking places, such as libraries or restaurants  Understand what tempts you and try to avoid these things  · Keep your hands busy  Hold things such as a stress ball or pen  · Put candy or toothpicks in your mouth  Keep lollipops, sugarless gum, or toothpicks with you at all times  · Do not have alcohol or caffeine  These drinks may tempt you to smoke   Drink healthy liquids such as water or juice instead  · Reward yourself when you resist your cravings  Rewards will motivate you and help you stay positive  · Do an activity that distracts you from your craving  Examples include going for a walk, exercising, or cleaning  Prevent weight gain after you quit:  You may gain a few pounds after you quit smoking  It is healthier for you to gain a few pounds than to continue to smoke  The following can help you prevent weight gain:  · Eat healthy foods  These include fruits, vegetables, whole-grain breads, low-fat dairy products, beans, lean meats, and fish  Eat healthy snacks, such as low-fat yogurt, if you get hungry between meals  · Drink water before, during, and between meals  This will make your stomach feel full and help prevent you from overeating  Ask your healthcare provider how much liquid to drink each day and which liquids are best for you  · Exercise  Take a walk or do some kind of exercise every day  Ask your healthcare provider what exercise is right for you  This may help reduce your cravings and reduce stress  For support and more information:   · [facility]  Phone: 6- 795 - 621-3565  Web Address: www [employer]  gov  © 2017 [address] [employer] Information is for End User's use only and may not be sold, redistributed or otherwise used for commercial purposes  All illustrations and images included in CareNotes® are the copyrighted property of [employer]  or [employer]  The above information is an  only  It is not intended as medical advice for individual conditions or treatments  Talk to your doctor, nurse or pharmacist before following any medical regimen to see if it is safe and effective for you

## 2018-08-09 NOTE — PROGRESS NOTES
General Cardiology   Progress Note -  [employer]   Jason Wang 67 y o  male MRN: [id number]    Unit/Bed#: [facility] 5-5 Encounter: [id number]  Assessment:  Principal Problem:    Acute ST elevation myocardial infarction (STEMI) of inferior wall (HCC)  Active Problems:  S/P drug eluting coronary stent placement  Tobacco Abuse  Polymyalgia Rheumatica; currently on methotrexate    Plan:  -Continue DAPT w/ aspirin 81 mg daily and Brilinita 90 mg BID; will discuss w/casemanagement cost of Brilinta   -Continue high intensity statin therapy w/ atorvastatin 80 mg daily  -Continue to hold beta blocker in the setting of persistent bradycardia; HR as low as 42 overnight  -A1c 5 9; lipid profile (T chol 147, trig 143, HDL 35, LDL 83)  -electrolytes stable; replete as needed keep K+>4 mag > 2, calcium > 7  -echo obrtained 8/8; pending final imaging results  -DVT prophylaxis w/ heparin subq TID  -Smoking cessation education; nicotine patch ordered   -[place] [facility] [place]  -Tenative plan for discharge on 8/10; will f/u as outpatient    Subjective  Review of Systems   Constitution: Negative for weakness and malaise/fatigue  Eyes: Negative for visual disturbance  Cardiovascular: Negative for chest pain, claudication, cyanosis, dyspnea on exertion, irregular heartbeat, leg swelling, near-syncope, orthopnea, palpitations, paroxysmal nocturnal dyspnea and syncope  Respiratory: Negative for cough and shortness of breath  Musculoskeletal: Negative for joint pain and myalgias  Gastrointestinal: Negative for constipation, diarrhea, heartburn, hematemesis, hematochezia, melena, nausea and vomiting  Genitourinary: Negative for dysuria and hematuria  Neurological: Negative for dizziness, focal weakness, headaches and light-headedness  All other systems reviewed and are negative  Objective:   Vitals: Blood pressure 142/72, pulse 62, temperature 97 7 °F (36 5 °C), temperature source Oral, resp   rate (!) 36, weight 94 8 kg (208 lb 14 4 oz), SpO2 98 %  ,       Body mass index is 29 97 kg/m²  ,     Systolic (35RQV), HID:004 , Min:98 , HOW:477     Diastolic (44YPA), XQR:46, Min:53, Max:102          Intake/Output Summary (Last 24 hours) at 08/09/18 1014  Last data filed at 08/09/18 0958   Gross per 24 hour   Intake             1660 ml   Output             1700 ml   Net              -40 ml     Weight (last 2 days)     Date/Time   Weight    08/09/18 0600  94 8 (208 9)    08/08/18 1138  100 (220 9)                Telemetry Review:    Sinus Bradycardia overnight on tele review; HR dropped into the low 40's    EKG personally reviewed by MARLENE Boyce  Physical Exam   Constitutional: He is oriented to person, place, and time  He appears well-developed and well-nourished  No distress  HENT:   Head: Normocephalic and atraumatic  Mouth/Throat: Oropharynx is clear and moist  No oropharyngeal exudate  Eyes: Conjunctivae are normal  Pupils are equal, round, and reactive to light  No scleral icterus  Neck: Normal range of motion  Neck supple  No JVD present  Cardiovascular: Regular rhythm, S1 normal, S2 normal and intact distal pulses  Bradycardia present  Exam reveals no gallop and no friction rub  No murmur heard  Pulses:       Radial pulses are 2+ on the right side, and 2+ on the left side  Dorsalis pedis pulses are 2+ on the right side, and 2+ on the left side  Pulmonary/Chest: Effort normal and breath sounds normal  No respiratory distress  He has no wheezes  He has no rales  Abdominal: Soft  Bowel sounds are normal  He exhibits no distension  There is no tenderness  Musculoskeletal: Normal range of motion  He exhibits no edema or deformity  Lymphadenopathy:     He has no cervical adenopathy  Neurological: He is alert and oriented to person, place, and time  Skin: Skin is warm and dry  He is not diaphoretic     Right radial puncture site is soft; bruised, no heamtoma   Psychiatric: He has a normal mood and affect       LABORATORY RESULTS    Results from last 7 days  Lab Units 08/08/18  1845 08/08/18  1608 08/08/18  1244   TROPONIN I ng/mL 1 38* 1 05* 0 37*     CBC with diff:     Results from last 7 days  Lab Units 08/09/18  0444 08/08/18  1244 08/08/18  1015   WBC Thousand/uL 6 45  --  6 70   HEMOGLOBIN g/dL 12 5  --  14 7   HEMATOCRIT % 37 1  --  41 8   MCV fL 95  --  94   PLATELETS Thousands/uL 170 166 216   MCH pg 32 1  --  33 1   MCHC g/dL 33 7  --  35 0   RDW % 13 1  --  13 6   MPV fL 10 1 10 0 8 5*   NRBC AUTO /100 WBCs 0  --  0       CMP:    Results from last 7 days  Lab Units 08/09/18  0439 08/08/18  1244 08/08/18  1015   SODIUM mmol/L 142 139 141   POTASSIUM mmol/L 4 1 4 2 3 3*   CHLORIDE mmol/L 109* 107 105   CO2 mmol/L 28 25 27   ANION GAP mmol/L 5 7 9   BUN mg/dL 12 12 13   CREATININE mg/dL 1 10 1 04 1 15   GLUCOSE RANDOM mg/dL 108 116 96   CALCIUM mg/dL 8 3 8 4 9 5   AST U/L 22  --  20   ALT U/L 39  --  29   ALK PHOS U/L 49  --  47*   TOTAL PROTEIN g/dL 5 7*  --  6 4   BILIRUBIN TOTAL mg/dL 0 97  --  0 60   EGFR ml/min/1 73sq m 67 71 63       BMP:    Results from last 7 days  Lab Units 08/09/18  0439 08/08/18  1244 08/08/18  1015   SODIUM mmol/L 142 139 141   POTASSIUM mmol/L 4 1 4 2 3 3*   CHLORIDE mmol/L 109* 107 105   CO2 mmol/L 28 25 27   BUN mg/dL 12 12 13   CREATININE mg/dL 1 10 1 04 1 15   GLUCOSE RANDOM mg/dL 108 116 96   CALCIUM mg/dL 8 3 8 4 9 5       No results found for: NTBNP            Results from last 7 days  Lab Units 08/09/18  0439 08/08/18  1244 08/08/18  1015   MAGNESIUM mg/dL 2 4 1 8 1 8*            Results from last 7 days  Lab Units 08/09/18  0439   HEMOGLOBIN A1C % 5 9                Results from last 7 days  Lab Units 08/08/18  1015   INR  0 97       Lipid Profile:   Lab Results   Component Value Date    CHOL 147 08/09/2018    CHOL 177 08/08/2018     Lab Results   Component Value Date    HDL 35 (L) 08/09/2018    HDL 38 (L) 08/08/2018     Lab Results Component Value Date    LDLCALC 83 08/09/2018    LDLCALC 104 08/08/2018     Lab Results   Component Value Date    TRIG 143 08/09/2018    TRIG 173 (H) 08/08/2018       Cardiac testing:   No results found for this or any previous visit  No results found for this or any previous visit  No results found for this or any previous visit  No procedure found  No results found for this or any previous visit  Meds/Allergies   all current active meds have been reviewed, current meds:   Current Facility-Administered Medications   Medication Dose Route Frequency    acetaminophen (TYLENOL) tablet 650 mg  650 mg Oral Q6H PRN    aspirin chewable tablet 81 mg  81 mg Oral Daily    atorvastatin (LIPITOR) tablet 80 mg  80 mg Oral QPM    cholecalciferol (VITAMIN D3) tablet 1,000 Units  1,000 Units Oral Daily    folic acid (FOLVITE) tablet 1 mg  1 mg Oral Daily    heparin (porcine) subcutaneous injection 5,000 Units  5,000 Units Subcutaneous Q8H [facility]    [START ON 8/14/2018] methotrexate tablet 10 mg  10 mg Oral Weekly    nicotine (NICODERM CQ) 14 mg/24hr TD 24 hr patch 1 patch  1 patch Transdermal Daily    nitroglycerin (NITROSTAT) SL tablet 0 4 mg  0 4 mg Sublingual Q5 Min PRN    ondansetron (ZOFRAN) injection 4 mg  4 mg Intravenous Q6H PRN    ondansetron (ZOFRAN) injection 4 mg  4 mg Intravenous Q6H PRN    pantoprazole (PROTONIX) EC tablet 40 mg  40 mg Oral Early Morning    tamsulosin (FLOMAX) capsule 0 4 mg  0 4 mg Oral Daily With Dinner    ticagrelor (BRILINTA) tablet 180 mg  180 mg Oral Once    Followed by    ticagrelor (BRILINTA) tablet 90 mg  90 mg Oral BID    and PTA meds:   Prior to Admission Medications   Prescriptions Last Dose Informant Patient Reported? Taking?    Omeprazole 20 MG TBEC 8/8/2018 at Unknown time  Yes Yes   Sig: Take by mouth   cholecalciferol (VITAMIN D3) 1,000 units tablet 8/8/2018 at Unknown time  Yes Yes   Sig: Take by mouth   folic acid (FOLVITE) 1 mg tablet 8/8/2018 at Unknown time Yes Yes   Sig: Take by mouth   methotrexate 2 5 mg tablet 8/7/2018 at Unknown time  Yes Yes   Sig: Take 10 mg by mouth once a week     oxyCODONE-acetaminophen (PERCOCET) 7 5-325 MG per tablet 8/7/2018 at Unknown time  Yes Yes   Sig: Take 7 5 tablets by mouth 2 (two) times a day as needed   tamsulosin (FLOMAX) 0 4 mg 8/7/2018 at Unknown time  Yes Yes   Sig: Take by mouth      Facility-Administered Medications: None     Prescriptions Prior to Admission   Medication    cholecalciferol (VITAMIN D3) 1,000 units tablet    folic acid (FOLVITE) 1 mg tablet    methotrexate 2 5 mg tablet    Omeprazole 20 MG TBEC    oxyCODONE-acetaminophen (PERCOCET) 7 5-325 MG per tablet    tamsulosin (FLOMAX) 0 4 mg          Counseling / Coordination of Care  Total floor / unit time spent today 20 minutes  Greater than 50% of total time was spent with the patient and / or family counseling and / or coordination of care  ** Please Note: Dragon 360 Dictation voice to text software may have been used in the creation of this document   **

## 2018-08-10 VITALS
SYSTOLIC BLOOD PRESSURE: 146 MMHG | DIASTOLIC BLOOD PRESSURE: 81 MMHG | OXYGEN SATURATION: 99 % | RESPIRATION RATE: 20 BRPM | TEMPERATURE: 97.6 F | WEIGHT: 206.35 LBS | BODY MASS INDEX: 29.61 KG/M2 | HEART RATE: 56 BPM

## 2018-08-10 PROCEDURE — 99239 HOSP IP/OBS DSCHRG MGMT >30: CPT | Performed by: INTERNAL MEDICINE

## 2018-08-10 RX ORDER — NICOTINE 21 MG/24HR
1 PATCH, TRANSDERMAL 24 HOURS TRANSDERMAL DAILY
Qty: 28 PATCH | Refills: 0 | Status: SHIPPED | OUTPATIENT
Start: 2018-08-10 | End: 2019-04-05

## 2018-08-10 RX ORDER — NITROGLYCERIN 0.4 MG/1
0.4 TABLET SUBLINGUAL
Qty: 90 TABLET | Refills: 0 | Status: SHIPPED | OUTPATIENT
Start: 2018-08-10

## 2018-08-10 RX ORDER — ATORVASTATIN CALCIUM 80 MG/1
80 TABLET, FILM COATED ORAL EVERY EVENING
Qty: 30 TABLET | Refills: 1 | Status: SHIPPED | OUTPATIENT
Start: 2018-08-10 | End: 2018-10-03 | Stop reason: SDUPTHER

## 2018-08-10 RX ORDER — ASPIRIN 81 MG/1
81 TABLET, CHEWABLE ORAL DAILY
Qty: 30 TABLET | Refills: 0 | Status: SHIPPED | OUTPATIENT
Start: 2018-08-11 | End: 2019-12-11 | Stop reason: DRUGHIGH

## 2018-08-10 RX ADMIN — HEPARIN SODIUM 5000 UNITS: 5000 INJECTION, SOLUTION INTRAVENOUS; SUBCUTANEOUS at 05:19

## 2018-08-10 RX ADMIN — FOLIC ACID 1 MG: 1 TABLET ORAL at 08:17

## 2018-08-10 RX ADMIN — ASPIRIN 81 MG 81 MG: 81 TABLET ORAL at 08:17

## 2018-08-10 RX ADMIN — TICAGRELOR 90 MG: 90 TABLET ORAL at 08:18

## 2018-08-10 RX ADMIN — PANTOPRAZOLE SODIUM 40 MG: 40 TABLET, DELAYED RELEASE ORAL at 05:19

## 2018-08-10 RX ADMIN — VITAMIN D, TAB 1000IU (100/BT) 1000 UNITS: 25 TAB at 08:17

## 2018-08-10 NOTE — NURSING NOTE
Tulio Rico filled, delivered to room  Discharge instructions reviewed, IV dc'd, catheter intact  Discharged to home

## 2018-08-13 ENCOUNTER — APPOINTMENT (OUTPATIENT)
Dept: LAB | Facility: CLINIC | Age: 73
End: 2018-08-13
Payer: MEDICARE

## 2018-08-13 DIAGNOSIS — I21.19 ACUTE ST ELEVATION MYOCARDIAL INFARCTION (STEMI) OF INFERIOR WALL (HCC): ICD-10-CM

## 2018-08-13 LAB
ANION GAP SERPL CALCULATED.3IONS-SCNC: 8 MMOL/L (ref 4–13)
BUN SERPL-MCNC: 16 MG/DL (ref 5–25)
CALCIUM SERPL-MCNC: 9.4 MG/DL (ref 8.3–10.1)
CHLORIDE SERPL-SCNC: 106 MMOL/L (ref 100–108)
CO2 SERPL-SCNC: 27 MMOL/L (ref 21–32)
CREAT SERPL-MCNC: 1.16 MG/DL (ref 0.6–1.3)
ERYTHROCYTE [DISTWIDTH] IN BLOOD BY AUTOMATED COUNT: 13.2 % (ref 11.6–15.1)
GFR SERPL CREATININE-BSD FRML MDRD: 63 ML/MIN/1.73SQ M
GLUCOSE P FAST SERPL-MCNC: 96 MG/DL (ref 65–99)
HCT VFR BLD AUTO: 44.4 % (ref 36.5–49.3)
HGB BLD-MCNC: 15.1 G/DL (ref 12–17)
MCH RBC QN AUTO: 32.4 PG (ref 26.8–34.3)
MCHC RBC AUTO-ENTMCNC: 34 G/DL (ref 31.4–37.4)
MCV RBC AUTO: 95 FL (ref 82–98)
PLATELET # BLD AUTO: 215 THOUSANDS/UL (ref 149–390)
PMV BLD AUTO: 11 FL (ref 8.9–12.7)
POTASSIUM SERPL-SCNC: 4.1 MMOL/L (ref 3.5–5.3)
RBC # BLD AUTO: 4.66 MILLION/UL (ref 3.88–5.62)
SODIUM SERPL-SCNC: 141 MMOL/L (ref 136–145)
WBC # BLD AUTO: 5.2 THOUSAND/UL (ref 4.31–10.16)

## 2018-08-13 PROCEDURE — 80048 BASIC METABOLIC PNL TOTAL CA: CPT

## 2018-08-13 PROCEDURE — 85027 COMPLETE CBC AUTOMATED: CPT

## 2018-08-13 PROCEDURE — 36415 COLL VENOUS BLD VENIPUNCTURE: CPT

## 2018-08-15 ENCOUNTER — OFFICE VISIT (OUTPATIENT)
Dept: CARDIOLOGY CLINIC | Facility: CLINIC | Age: 73
End: 2018-08-15
Payer: MEDICARE

## 2018-08-15 VITALS
SYSTOLIC BLOOD PRESSURE: 132 MMHG | HEART RATE: 64 BPM | DIASTOLIC BLOOD PRESSURE: 82 MMHG | HEIGHT: 70 IN | WEIGHT: 206 LBS | BODY MASS INDEX: 29.49 KG/M2

## 2018-08-15 DIAGNOSIS — I21.19 ACUTE ST ELEVATION MYOCARDIAL INFARCTION (STEMI) OF INFERIOR WALL (HCC): ICD-10-CM

## 2018-08-15 DIAGNOSIS — I25.10 CORONARY ARTERY DISEASE INVOLVING NATIVE CORONARY ARTERY OF NATIVE HEART, ANGINA PRESENCE UNSPECIFIED: Primary | ICD-10-CM

## 2018-08-15 PROCEDURE — 99214 OFFICE O/P EST MOD 30 MIN: CPT | Performed by: INTERNAL MEDICINE

## 2018-08-15 RX ORDER — CLOPIDOGREL BISULFATE 75 MG/1
75 TABLET ORAL DAILY
Qty: 90 TABLET | Refills: 5 | Status: SHIPPED | OUTPATIENT
Start: 2018-09-08 | End: 2019-11-06 | Stop reason: SDUPTHER

## 2018-08-15 NOTE — PROGRESS NOTES
Patient ID: Ankush Shaw is a 67 y o  male  Plan:      Coronary artery disease involving native coronary artery of native heart  Cath 8/8/18  Single vessel CAD, with a 99% mid RCA representing the culprit for the patient's inferior STEMI  Successful primary PCI, mid RCA, with a reduction in stenosis from 99% to 0%      Will continue Brillanta for 1 month total and then change to clopidogrel        Tobacco abuse  No cigarettes since the procedure  He is going to stay off  Follow up Plan: Cardiac rehab  No smoking  F/u visit, ecg, and echo in 2-3 months  I encouraged him to stick with the Brilinta for the month and then will change over to clopidogrel  HPI:   Eder Meeks is a very nice man who 1 week ago presented with chest pain  He had an acute inferior wall MI and was flown to [place] where he underwent stenting of a 99% right coronary artery stenosis  He has felt a little bit apprehensive since then  He has had some diarrhea which he suspects is from [place]  No chest pain or chest pressure  No results found for this visit on 08/15/18  Other cardiac testing:  Echocardiogram in the hospital revealed significant hypokinesia of the inferior wall  Past Surgical History:   Procedure Laterality Date    BACK SURGERY      CORONARY ANGIOPLASTY WITH STENT PLACEMENT  08/08/2018    ALVIN to mid RCA (99%)   EYE SURGERY      SKIN BIOPSY       CMP:   Lab Results   Component Value Date     08/13/2018    K 4 1 08/13/2018     08/13/2018    CO2 27 08/13/2018    BUN 16 08/13/2018    CREATININE 1 16 08/13/2018    GLUCOSE 108 08/09/2018    EGFR 63 08/13/2018       Lipid Profile:   Lab Results   Component Value Date    CHOL 147 08/09/2018    TRIG 143 08/09/2018    HDL 35 (L) 08/09/2018         Review of Systems   10  point ROS  was otherwise non pertinent or negative except as per HPI or as below     Gait:  Normal        Objective:     /82   Pulse 64   Ht 5' 10" (1 778 m)   Wt 93 4 kg (206 lb)   BMI 29 56 kg/m²     PHYSICAL EXAM:    General:  Normal appearance in no distress  Eyes:  Anicteric  Oral mucosa:  Moist   Neck:  No JVD  Carotid upstrokes are brisk without bruits  No masses  Chest:  Clear to auscultation and percussion  Cardiac:  Normal PMI  Normal S1 and S2  No murmur gallop or rub  Abdomen:  Soft and nontender  No palpable organomegaly or aortic enlargement  Extremities:  No peripheral edema  Musculoskeletal:  Symmetric  Vascular:  Femoral pulses are brisk without bruits  Popliteal pulses are intact bilaterally  Pedal pulses are intact  Neuro:  Grossly symmetric  Psych:  Alert and oriented x3  Right wrist catheterization site looks terrific          Current Outpatient Prescriptions:     aspirin 81 mg chewable tablet, Chew 1 tablet (81 mg total) daily, Disp: 30 tablet, Rfl: 0    atorvastatin (LIPITOR) 80 mg tablet, Take 1 tablet (80 mg total) by mouth every evening, Disp: 30 tablet, Rfl: 1    cholecalciferol (VITAMIN D3) 1,000 units tablet, Take by mouth, Disp: , Rfl:     folic acid (FOLVITE) 1 mg tablet, Take by mouth, Disp: , Rfl:     methotrexate 2 5 mg tablet, Take 10 mg by mouth once a week  , Disp: , Rfl:     nitroglycerin (NITROSTAT) 0 4 mg SL tablet, Place 1 tablet (0 4 mg total) under the tongue every 5 (five) minutes as needed for chest pain, Disp: 90 tablet, Rfl: 0    Omeprazole 20 MG TBEC, Take by mouth, Disp: , Rfl:     tamsulosin (FLOMAX) 0 4 mg, Take by mouth, Disp: , Rfl:     ticagrelor (BRILINTA) 90 MG, Take 1 tablet (90 mg total) by mouth 2 (two) times a day for 24 days, Disp: 60 tablet, Rfl: 0    [START ON 9/8/2018] clopidogrel (PLAVIX) 75 mg tablet, Take 1 tablet (75 mg total) by mouth daily, Disp: 90 tablet, Rfl: 5    nicotine (NICODERM CQ) 14 mg/24hr TD 24 hr patch, Place 1 patch on the skin daily (Patient not taking: Reported on 8/15/2018 ), Disp: 28 patch, Rfl: 0    oxyCODONE-acetaminophen (PERCOCET) 7 5-325 MG per tablet, Take 7 5 tablets by mouth 2 (two) times a day as needed, Disp: , Rfl:   No Known Allergies  Past Medical History:   Diagnosis Date    [facility] )     Coronary artery disease     ALVIN to mid RCA 8/8/2018    GERD (gastroesophageal reflux disease)     History of transfusion     Polymyalgia (HCC)     ST elevation myocardial infarction (STEMI) of inferior wall ([facility] ) 08/08/2018           History   Smoking Status    Former Smoker    Packs/day: 0 50    Years: 50 00    Quit date: 8/8/2018   Smokeless Tobacco    Never Used

## 2018-08-15 NOTE — PATIENT INSTRUCTIONS
Change to clopidogrel on or about September 8 from [facility]  Cardiac Rehab  Return visit and echocardiogram in 2 months

## 2018-08-15 NOTE — ASSESSMENT & PLAN NOTE
Cath 8/8/18  Single vessel CAD, with a 99% mid RCA representing the culprit for the patient's inferior STEMI    Successful primary PCI, mid RCA, with a reduction in stenosis from 99% to 0%      Will continue Brillanta for 1 month total and then change to clopidogrel

## 2018-09-11 ENCOUNTER — CLINICAL SUPPORT (OUTPATIENT)
Dept: CARDIAC REHAB | Facility: HOSPITAL | Age: 73
End: 2018-09-11
Attending: INTERNAL MEDICINE
Payer: MEDICARE

## 2018-09-11 DIAGNOSIS — I25.10 CORONARY ARTERY DISEASE INVOLVING NATIVE CORONARY ARTERY OF NATIVE HEART, ANGINA PRESENCE UNSPECIFIED: ICD-10-CM

## 2018-09-11 DIAGNOSIS — Z95.5 S/P DRUG ELUTING CORONARY STENT PLACEMENT: ICD-10-CM

## 2018-09-11 DIAGNOSIS — I21.19 ACUTE ST ELEVATION MYOCARDIAL INFARCTION (STEMI) OF INFERIOR WALL (HCC): ICD-10-CM

## 2018-09-11 DIAGNOSIS — I25.10 ATHEROSCLEROSIS OF NATIVE CORONARY ARTERY OF NATIVE HEART, ANGINA PRESENCE UNSPECIFIED: ICD-10-CM

## 2018-09-11 PROCEDURE — 93798 PHYS/QHP OP CAR RHAB W/ECG: CPT

## 2018-09-11 NOTE — PROGRESS NOTES
Pt  Tolerated well  Pt   Demonstrated a Normal hemodynamic response to exercise  Pt  asymptomatic the entire session

## 2018-09-12 ENCOUNTER — CLINICAL SUPPORT (OUTPATIENT)
Dept: CARDIAC REHAB | Facility: HOSPITAL | Age: 73
End: 2018-09-12
Attending: INTERNAL MEDICINE
Payer: MEDICARE

## 2018-09-12 VITALS — HEIGHT: 70 IN | WEIGHT: 200 LBS | BODY MASS INDEX: 28.63 KG/M2

## 2018-09-12 DIAGNOSIS — Z95.5 S/P DRUG ELUTING CORONARY STENT PLACEMENT: ICD-10-CM

## 2018-09-12 DIAGNOSIS — I25.10 CORONARY ARTERY DISEASE INVOLVING NATIVE CORONARY ARTERY OF NATIVE HEART, ANGINA PRESENCE UNSPECIFIED: ICD-10-CM

## 2018-09-12 PROCEDURE — 93798 PHYS/QHP OP CAR RHAB W/ECG: CPT

## 2018-09-12 NOTE — PROGRESS NOTES
Cardiac/Pulmonary Rehabilitation Plan of Care         Today's date: 2018   Visits:   Patient name: Elizabeth You      : 1945  Age: 67 y o  MRN: [id number]  Referring Physician: Nikole Eubanks MD  Provider: Cardiac Rehab exercise Room [address] [place] [address]   Clinician: Jarrod WRIGHT    Dx:   Encounter Diagnoses   Name Primary?  S/P drug eluting coronary stent placement     Coronary artery disease involving native coronary artery of native heart, angina presence unspecified      Date of onset: 2018    Medication compliance: Yes   Comments: N/A  Fall Risk: No   Comments: Pt  Is not a fall risk    EXERCISE/ACTIVITY    Cardiovascular:   Min: Goal is 40-50 min  Of cardio exercise   METS: N/A   Hr: THR=   APMHR (Pt  Does NOT take a Beta Blocker)   RPE: 12-13   O2 sat: Greater than 90%    Modalities: Treadmill, AD bike, UBE, Lifecycle and NuStep  Strength trainin-3 days / week   Modalities: Leg press and Chest press  EKG changes: None  Dyspnea score: 0  Home activity: Pt  Unsure of what he is able to perform at home since discharged S/P MI/ Stent Pt  I reviewed home activities with patient   Pt demonstrated good listening skills and verbal understanding of what activities are safe to perform at home  Goals: increase activity at home gradually as per tolerance and symptoms  Education: Verbal followed by written material   Plan: Attend phase II cardiac rehab 2-3 days per week  Readiness to change: Pt is ready and willing to change to improve cardiopulmonary condition and decrease risk factors    NUTRITION    Weight control:    Starting weight: 200lb   Current weight:  200lb  Waist circumference:    Starting: N/A   Current: N/A  Diabetes: N/A  Lipid management: yes  Goals: Improve lipid profile upon completion of Phase II Cardiac rehab, Decrease LDL's ,increase HDL's through diet and exercise   Education: verbal followed by written material  Plan: attend Phase Ii cardiac Rehab TIW x 36 sessions  Readiness to change: Pt  Is ready to change to decrease cardiovascular risk profile    PSYCHOSOCIAL    Emotional: No issues  Self-reported stress level: 2   Social support: yes  Goals: Increase socialization by attending community events and attending Cardiac Rehab  Education: verbal followed by written material and as per pt  requests  Plan: Attend Phase II cardiac Rehab until 36 sessions are completed  Readiness to change: Pt  Is ready to change to decrease cardiovascular risk profile    OTHER CORE COMPONENTS     Tobacco:   History   Smoking Status    Former Smoker    Packs/day: 0 50    Years: 50 00    Quit date: 8/8/2018   Smokeless Tobacco    Never Used     Blood pressure:    Resting: Resting BP: 132/60   Exercise: Recovery BP: 130/60  Goals: Adhere to American Heart Association Guidlines  Education: verbal followed by written  Plan: CR TIW x 36 sessions  Readiness to change: Pt   Is willing and ready to change lifestyle to decrease cardiovascular risk profile

## 2018-09-12 NOTE — PROGRESS NOTES
Joseph Hallman   1945          Pre Post % Change Goal   Date:       Physical       Sub Max ETT (mets)    10% increase   6MWT (feet)    10% increase   UCSD Dyspnea Score    5 pt decrease   Supplemental O2 use (L)       [employer] [facility] (est peak O2)       Peak exercise CR/SC (mets)    40% increase   Emotional       PHQ9 (> 10 refer to MD)    4 pt decrease   [place] (lower score = improvement)       Total    < 27   Feelings    < 3   Physical Fitness    < 3   Social Support    < 3   Daily Activities    < 3   Social Activities    < 3   Pain    < 3   Overall Health    < 3   Quality of Life    < 3   Change in Health    < 3   Dietary       Rate your plate    > 58   Measurements       Weight    2 5 - 5%   BMI    19 - 25   Waist Circ      < 36 M / < 35 F   % Body fat    < 25 M / < 33 F   BP left arm               (systolic)    < 903   (diastolic)    < 90   Smoking #/day  (if applicable)    0   Lipids/Glucose (Date)       Total cholesterol    50 - 200   Triglycerides    < 150   HDL    40 - 60   LDL    < 100   A1C    4 0 - 5 6%   Fasting BG

## 2018-09-12 NOTE — PROGRESS NOTES
CARDIAC/PULMONARY REHAB ASSESSMENT    Today's date: 2018   Patient name: Phill Clark      : 1945       MRN: [id number]  PCP: Andres Pineda DO  Cardiologist: Geoff Antony  Surgeon: Samira Garcia  Dx:   Encounter Diagnoses   Name Primary?     S/P drug eluting coronary stent placement     Coronary artery disease involving native coronary artery of native heart, angina presence unspecified      Date of onset:2018  Cultural needs: N/A    Height:  5'10"   Weight:   200lb   Medical History:   Past Medical History:   Diagnosis Date    [facility]chronic obstructive [facility] disease) ([facility] )     Coronary artery disease     ALVIN to mid RCA 2018    GERD (gastroesophageal reflux disease)     History of transfusion     Polymyalgia (HCC)     ST elevation myocardial infarction ([facility]I) [facility] inferior wall ([facility]) 2018       Physical Limitations: None    Risk Factors   Cholesterol: Yes  Smoking: No  HTN: Yes  DM: No  Obesity: Yes  Inactivity: Yes  Family History:   Family History   Problem Relation Age of Onset    Heart attack Mother     Heart disease Brother      Allergies: No Known Allergies  Other: N/A    Current Medications:   Current Outpatient Prescriptions   Medication Sig Dispense Refill    aspirin 81 mg chewable tablet Chew 1 tablet (81 mg total) daily 30 tablet 0    atorvastatin (LIPITOR) 80 mg tablet Take 1 tablet (80 mg total) by mouth every evening 30 tablet 1    cholecalciferol (VITAMIN D3) 1,000 units tablet Take by mouth      clopidogrel (PLAVIX) 75 mg tablet Take 1 tablet (75 mg total) by mouth daily 90 tablet 5    folic acid (FOLVITE) 1 mg tablet Take by mouth      methotrexate 2 5 mg tablet Take 10 mg by mouth once a week        nicotine (NICODERM CQ) 14 mg/24hr TD 24 hr patch Place 1 patch on the skin daily (Patient not taking: Reported on 8/15/2018 ) 28 patch 0    nitroglycerin (NITROSTAT) 0 4 mg SL tablet Place 1 tablet (0 4 mg total) under the tongue every 5 (five) minutes as needed for chest pain 90 tablet 0    Omeprazole 20 MG TBEC Take by mouth      oxyCODONE-acetaminophen (PERCOCET) 7 5-325 MG per tablet Take 7 5 tablets by mouth 2 (two) times a day as needed      tamsulosin (FLOMAX) 0 4 mg Take by mouth      ticagrelor (BRILINTA) 90 MG Take 1 tablet (90 mg total) by mouth 2 (two) times a day for 24 days 60 tablet 0     No current facility-administered medications for this visit  Functional Status Prior to Diagnosis for Treatment   Occupation: Retired  Recreation: fishing, outdoor activities  ADLs: independent  [place]: Yes  Exercise: NO  Other: N/A    Current Functional Status  Occupation: Retired  Recreation: fishing, outdoor activities  ADLs: Independent  [place]: Yes  Exercise: Not prior to Cardiac Rehab  Other: N/A    Short Term Program Goals: Pt  Wishes to return to activities as he performed prior to MI and stent home activities such as yard work, and cleaning remaining asymptomatic    Long Term Goals: Upon completion of Phase II Cardiac Rehab x 36 sessions, pt  Wishes to improve cardiopulmonary condition and physical capacity for functional exertion remaining asymptomatic    Ability to reach goals/rehabilitation potential: god potential to reach goals,     Projected return to function: Upon completion of Phas II cardiac Rehab x 36 sessions  Objective tests: 6MWT      Nutritional   Fats/Oil: Occasionally selects good choices such as olive or canola oil  Sodium: Rarely  Sweets/ETOH/Caffeine: Often eats sweets  Dairy/Eggs: usually eats 3 or less per week  Meats:    Beef: yes   Fish: less than 1 per week  Chicken/Turkey: yes  Pork/Ham: sometimes  Processed Meats: sometimes  Fruits: 1 cup per day  Vegetables: 1 cup per day  Grains/Beans: usually  Supplements: N/A  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    Clinical Implications: N/A    Goals: Pt   Wishes to monitor caloric intake for weight loss, select appropriate healthier food choices, decrease sweets and pastries, adhere to appropriate portion sizes  Emotional/Social  Marital status:      Life Stressors: Health    Goals: Upon completion of Phase II cardiac Rehab, x 36 sessions, Pt  Wishes to decrease total body weight, select healthier food options, label read for calorie consumption and appropriate heart healthy food choices  Domestic Violence Screening: No    Comments: Pt  Should do well with nutritional aspect of cardiac Rehab  Pt  Demonstrates good listening skills, learning skills and is knowledgeable concerning diet and nutrition

## 2018-09-14 ENCOUNTER — CLINICAL SUPPORT (OUTPATIENT)
Dept: CARDIAC REHAB | Facility: HOSPITAL | Age: 73
End: 2018-09-14
Attending: INTERNAL MEDICINE
Payer: MEDICARE

## 2018-09-14 DIAGNOSIS — Z95.5 S/P DRUG ELUTING CORONARY STENT PLACEMENT: ICD-10-CM

## 2018-09-14 PROCEDURE — 93798 PHYS/QHP OP CAR RHAB W/ECG: CPT

## 2018-09-17 ENCOUNTER — CLINICAL SUPPORT (OUTPATIENT)
Dept: CARDIAC REHAB | Facility: HOSPITAL | Age: 73
End: 2018-09-17
Attending: INTERNAL MEDICINE
Payer: MEDICARE

## 2018-09-17 DIAGNOSIS — Z95.5 S/P DRUG ELUTING CORONARY STENT PLACEMENT: ICD-10-CM

## 2018-09-17 PROCEDURE — 93798 PHYS/QHP OP CAR RHAB W/ECG: CPT

## 2018-09-19 ENCOUNTER — CLINICAL SUPPORT (OUTPATIENT)
Dept: CARDIAC REHAB | Facility: HOSPITAL | Age: 73
End: 2018-09-19
Attending: INTERNAL MEDICINE
Payer: MEDICARE

## 2018-09-19 DIAGNOSIS — Z95.5 S/P DRUG ELUTING CORONARY STENT PLACEMENT: ICD-10-CM

## 2018-09-19 PROCEDURE — 93798 PHYS/QHP OP CAR RHAB W/ECG: CPT | Performed by: PHYSICAL THERAPIST

## 2018-09-19 NOTE — PROGRESS NOTES
Patient progressing well in CR  Correct hemodynamic responses to activity  Excellent effort entire session

## 2018-09-21 ENCOUNTER — CLINICAL SUPPORT (OUTPATIENT)
Dept: CARDIAC REHAB | Facility: HOSPITAL | Age: 73
End: 2018-09-21
Attending: INTERNAL MEDICINE
Payer: MEDICARE

## 2018-09-21 DIAGNOSIS — Z95.5 STENTED CORONARY ARTERY: ICD-10-CM

## 2018-09-21 PROCEDURE — 93798 PHYS/QHP OP CAR RHAB W/ECG: CPT | Performed by: PHYSICAL THERAPIST

## 2018-09-21 NOTE — PROGRESS NOTES
Patient progressing well in CR  Correct hemodynamic responses to activity   Excellent carryover of program

## 2018-09-24 ENCOUNTER — CLINICAL SUPPORT (OUTPATIENT)
Dept: CARDIAC REHAB | Facility: HOSPITAL | Age: 73
End: 2018-09-24
Attending: INTERNAL MEDICINE
Payer: MEDICARE

## 2018-09-24 DIAGNOSIS — Z95.5 S/P DRUG ELUTING CORONARY STENT PLACEMENT: ICD-10-CM

## 2018-09-24 PROCEDURE — 93798 PHYS/QHP OP CAR RHAB W/ECG: CPT

## 2018-09-24 NOTE — PROGRESS NOTES
Pt  Tolerated well  Normal hemodynamic response  Pt  asymptomatic the entire session  RPE = 12 on Lifecycle   Elliptical with intervals = somewhat hard

## 2018-09-26 ENCOUNTER — CLINICAL SUPPORT (OUTPATIENT)
Dept: CARDIAC REHAB | Facility: HOSPITAL | Age: 73
End: 2018-09-26
Attending: INTERNAL MEDICINE
Payer: MEDICARE

## 2018-09-26 DIAGNOSIS — Z95.1 POSTSURGICAL AORTOCORONARY BYPASS STATUS: ICD-10-CM

## 2018-09-26 PROCEDURE — 93798 PHYS/QHP OP CAR RHAB W/ECG: CPT

## 2018-09-26 NOTE — PROGRESS NOTES
Pt  Tolerated well  Normal hemodynamic response  Pt  asymptomatic the entire session  Pt   Puts forth a good effort during exercise session

## 2018-09-28 ENCOUNTER — CLINICAL SUPPORT (OUTPATIENT)
Dept: CARDIAC REHAB | Facility: HOSPITAL | Age: 73
End: 2018-09-28
Attending: INTERNAL MEDICINE
Payer: MEDICARE

## 2018-09-28 DIAGNOSIS — Z95.5 STENTED CORONARY ARTERY: ICD-10-CM

## 2018-09-28 PROCEDURE — 93798 PHYS/QHP OP CAR RHAB W/ECG: CPT

## 2018-09-28 NOTE — PROGRESS NOTES
Educated pt  On target heart rates, reviewed exercise prescription  Pt   Tolerated well  Normal hemodynamic response  Pt  asymptomatic the entire session

## 2018-10-01 ENCOUNTER — CLINICAL SUPPORT (OUTPATIENT)
Dept: CARDIAC REHAB | Facility: HOSPITAL | Age: 73
End: 2018-10-01
Attending: INTERNAL MEDICINE
Payer: MEDICARE

## 2018-10-01 DIAGNOSIS — Z95.5 S/P DRUG ELUTING CORONARY STENT PLACEMENT: ICD-10-CM

## 2018-10-01 PROCEDURE — 93798 PHYS/QHP OP CAR RHAB W/ECG: CPT

## 2018-10-03 ENCOUNTER — CLINICAL SUPPORT (OUTPATIENT)
Dept: CARDIAC REHAB | Facility: HOSPITAL | Age: 73
End: 2018-10-03
Attending: INTERNAL MEDICINE
Payer: MEDICARE

## 2018-10-03 DIAGNOSIS — Z95.5 S/P DRUG ELUTING CORONARY STENT PLACEMENT: ICD-10-CM

## 2018-10-03 DIAGNOSIS — E78.2 MIXED HYPERLIPIDEMIA: ICD-10-CM

## 2018-10-03 DIAGNOSIS — I21.19 ACUTE ST ELEVATION MYOCARDIAL INFARCTION (STEMI) OF INFERIOR WALL (HCC): Primary | ICD-10-CM

## 2018-10-03 PROCEDURE — 93798 PHYS/QHP OP CAR RHAB W/ECG: CPT

## 2018-10-03 RX ORDER — ATORVASTATIN CALCIUM 80 MG/1
80 TABLET, FILM COATED ORAL EVERY EVENING
Qty: 90 TABLET | Refills: 3 | Status: SHIPPED | OUTPATIENT
Start: 2018-10-03 | End: 2019-09-19 | Stop reason: SDUPTHER

## 2018-10-03 NOTE — PROGRESS NOTES
Patient progressing well in CR  Correct hemodynamic responses to activity  Excellent effort entire session  Program altered this session due to abdominal cramping - refer to flow sheet

## 2018-10-05 ENCOUNTER — CLINICAL SUPPORT (OUTPATIENT)
Dept: CARDIAC REHAB | Facility: HOSPITAL | Age: 73
End: 2018-10-05
Attending: INTERNAL MEDICINE
Payer: MEDICARE

## 2018-10-05 DIAGNOSIS — Z95.5 S/P DRUG ELUTING CORONARY STENT PLACEMENT: ICD-10-CM

## 2018-10-05 PROCEDURE — 93797 PHYS/QHP OP CAR RHAB WO ECG: CPT

## 2018-10-08 ENCOUNTER — APPOINTMENT (OUTPATIENT)
Dept: CARDIAC REHAB | Facility: HOSPITAL | Age: 73
End: 2018-10-08
Attending: INTERNAL MEDICINE
Payer: MEDICARE

## 2018-10-10 ENCOUNTER — CLINICAL SUPPORT (OUTPATIENT)
Dept: CARDIAC REHAB | Facility: HOSPITAL | Age: 73
End: 2018-10-10
Attending: INTERNAL MEDICINE
Payer: MEDICARE

## 2018-10-10 DIAGNOSIS — Z95.5 S/P DRUG ELUTING CORONARY STENT PLACEMENT: ICD-10-CM

## 2018-10-10 PROCEDURE — 93798 PHYS/QHP OP CAR RHAB W/ECG: CPT

## 2018-10-10 NOTE — PROGRESS NOTES
Cardiac Rehabilitation Plan of Care   30 day       Today's date: 10/10/2018   Visits: 12 out of 36  Patient name: Elver Sampson      : 1945  Age: 68 y o  MRN: [id number]  Referring Physician: Red Owusu MD  Provider: [facility]  Clinician: NATHANIEL Cooney    Dx: STEMI and drug eluting stent placement mid RCA  Date of onset: 18      SUMMARY OF PROGRESS:  Patient has been very compliant attending his CR sessions  Patient has resumed his ADLs w/out difficulty or symptoms  Patient is able to ambulate longer distances w/out symptoms or extreme fatigue  Patient exhibits correct hemodynamic responses to activity  Patient gives excellent effort entire session  Medication compliance: Yes   Comments: Patient admits to 100% medication compliance  Fall Risk: Low   Comments: Patient exhibits Good dynamic standing balance and 5/5 BLE strength    EKG changes: No signs of ectopy or ischemia      EXERCISE ASSESSMENT and PLAN    Current Exercise Program in Rehab:       Frequency: 3/week        Minutes: 40-50        METS: 3 6            HR: 20-30 >RHR   RPE: 11-12         Modalities: Treadmill, UBE, NuStep and Recumbent bike      Exercise Plan/Progression:    Frequency: 3/week   Minutes: 50-60    METS: > 4   HR: 20-30 >RHR   RPE:12-14   Modalities: Treadmill, UBE, NuStep and Recumbent bike    Strength trainin-3 days / week   Modalities: Leg Press    Progressing:  Yes    Home Exercise: Patient has increased his overall activity level at home  Encouraged walking program days off therapy      Goals: 10% improvement in functional capacity, Improved 6MWT distance by 10%, Resume ADLs with increased strength and Exercise 5 days/wk, >150mins/wk  Education: Benefit of exercise for CAD risk factors, home exercise guidelines, signs and sxs and RPE scale   Plan:education on home exercise guidelines and home exercise 30+ mins 2 days opposite CR  Readiness to change: Action      NUTRITION ASSESSMENT AND PLAN    Weight control:    Starting weight: 200 lb   Current weight:   200 lb  Diabetes: N/A  Lipid management: Discussed diet and lipid management  Goals:LDL <100, HDL >40, TRG <150, CHOL <200 and Improved Rate Your Plate score  >72  Education: heart healthy eating  low sodium diet  hydration  diet and lipid management  healthy choices while dining out  Progressing:Yes  Plan: Increase whole grains, increase fruits/vegs and Reduce added sugars <25g/day  Readiness to change: Action      PSYCHOSOCIAL ASSESSMENT AND PLAN    Emotional:              1-4 = Minimal Depression  Self-reported stress level: 3   Social support: Excellent  Goals:  improved [facility] QOL < 27, PHQ-9 - reduced severity by one level, Physical Fitness in [facility] Score < 3, Daily Activity in [facility] Score < 3, Overall Health in [facility] Score < 3, Quality of Life in [facility] Score < 3  and Change in Health in [facility] Score < 3   Education: signs/sxs of depression, benefits of positive support system and coping mechanisms  Progressing:Yes  Plan: PHQ-9 >5 will refer to MD and Practice relaxation techniques  Readiness to change: Action      OTHER CORE COMPONENTS     Tobacco:   History   Smoking Status    Former Smoker    Packs/day: 0 50    Years: 50 00    Quit date: 2018   Smokeless Tobacco    Never Used     Blood pressure:    Restin/60   Exercise: 180/70   Recovery:130/60    Goals: consistent BP < 130/80, reduced dietary sodium <2300mg and consistent exercise >150 mins/wk  Education:  understanding HTN and CAD and low sodium diet and HTN  Progressing:Yes  Plan: Class: Understanding Heart Disease and Class: Common Heart Medications  Readiness to change: Action

## 2018-10-12 ENCOUNTER — CLINICAL SUPPORT (OUTPATIENT)
Dept: CARDIAC REHAB | Facility: HOSPITAL | Age: 73
End: 2018-10-12
Attending: INTERNAL MEDICINE
Payer: MEDICARE

## 2018-10-12 DIAGNOSIS — Z95.5 S/P DRUG ELUTING CORONARY STENT PLACEMENT: ICD-10-CM

## 2018-10-12 PROCEDURE — 93798 PHYS/QHP OP CAR RHAB W/ECG: CPT

## 2018-10-15 ENCOUNTER — HOSPITAL ENCOUNTER (OUTPATIENT)
Dept: NON INVASIVE DIAGNOSTICS | Facility: CLINIC | Age: 73
Discharge: HOME | End: 2018-10-15
Payer: MEDICARE

## 2018-10-15 DIAGNOSIS — I25.10 CORONARY ARTERY DISEASE INVOLVING NATIVE CORONARY ARTERY OF NATIVE HEART, ANGINA PRESENCE UNSPECIFIED: ICD-10-CM

## 2018-10-15 PROCEDURE — 93306 TTE W/DOPPLER COMPLETE: CPT | Performed by: INTERNAL MEDICINE

## 2018-10-15 PROCEDURE — 93306 TTE W/DOPPLER COMPLETE: CPT

## 2018-10-17 ENCOUNTER — APPOINTMENT (OUTPATIENT)
Dept: CARDIAC REHAB | Facility: HOSPITAL | Age: 73
End: 2018-10-17
Attending: INTERNAL MEDICINE
Payer: MEDICARE

## 2018-10-19 ENCOUNTER — CLINICAL SUPPORT (OUTPATIENT)
Dept: CARDIAC REHAB | Facility: HOSPITAL | Age: 73
End: 2018-10-19
Attending: INTERNAL MEDICINE
Payer: MEDICARE

## 2018-10-19 DIAGNOSIS — Z95.5 S/P DRUG ELUTING CORONARY STENT PLACEMENT: ICD-10-CM

## 2018-10-19 PROCEDURE — 93798 PHYS/QHP OP CAR RHAB W/ECG: CPT | Performed by: PHYSICAL THERAPIST

## 2018-10-22 ENCOUNTER — CLINICAL SUPPORT (OUTPATIENT)
Dept: CARDIAC REHAB | Facility: HOSPITAL | Age: 73
End: 2018-10-22
Attending: INTERNAL MEDICINE
Payer: MEDICARE

## 2018-10-22 DIAGNOSIS — Z95.5 S/P DRUG ELUTING CORONARY STENT PLACEMENT: ICD-10-CM

## 2018-10-22 PROCEDURE — 93798 PHYS/QHP OP CAR RHAB W/ECG: CPT

## 2018-10-22 NOTE — PROGRESS NOTES
Pt  Tolerated today's exercise session  well  Pt   Demonstrated a normal hemodynamic response to prescribed exercise  Pt  asymptomatic the entire session

## 2018-10-24 ENCOUNTER — CLINICAL SUPPORT (OUTPATIENT)
Dept: CARDIAC REHAB | Facility: HOSPITAL | Age: 73
End: 2018-10-24
Attending: INTERNAL MEDICINE
Payer: MEDICARE

## 2018-10-24 ENCOUNTER — OFFICE VISIT (OUTPATIENT)
Dept: CARDIOLOGY CLINIC | Facility: CLINIC | Age: 73
End: 2018-10-24
Payer: MEDICARE

## 2018-10-24 VITALS
DIASTOLIC BLOOD PRESSURE: 80 MMHG | SYSTOLIC BLOOD PRESSURE: 150 MMHG | BODY MASS INDEX: 30.78 KG/M2 | HEART RATE: 60 BPM | WEIGHT: 215 LBS | HEIGHT: 70 IN

## 2018-10-24 DIAGNOSIS — I10 BENIGN ESSENTIAL HTN: ICD-10-CM

## 2018-10-24 DIAGNOSIS — E78.2 MIXED HYPERLIPIDEMIA: ICD-10-CM

## 2018-10-24 DIAGNOSIS — I25.10 CORONARY ARTERY DISEASE INVOLVING NATIVE CORONARY ARTERY OF NATIVE HEART WITHOUT ANGINA PECTORIS: Primary | ICD-10-CM

## 2018-10-24 DIAGNOSIS — Z95.5 S/P DRUG ELUTING CORONARY STENT PLACEMENT: ICD-10-CM

## 2018-10-24 PROCEDURE — 99213 OFFICE O/P EST LOW 20 MIN: CPT | Performed by: INTERNAL MEDICINE

## 2018-10-24 PROCEDURE — 93798 PHYS/QHP OP CAR RHAB W/ECG: CPT

## 2018-10-24 RX ORDER — PANTOPRAZOLE SODIUM 40 MG/1
40 TABLET, DELAYED RELEASE ORAL DAILY
Refills: 0 | COMMUNITY
Start: 2018-10-17

## 2018-10-24 NOTE — PROGRESS NOTES
Patient ID: Jennifer Louis is a 68 y o  male  Plan:      S/P drug eluting coronary stent placement  S/p ALVIN to RCA in Aug 2018  On Plavix, ASA and Lipitor  Benign essential HTN  Blood pressure a bit high today but it has been quite fine at cardiac rehab several times per week  I am not going to make any changes therefore in his regimen  Mixed hyperlipidemia  Continue high-intensity statin therapy  He is tolerating this well  Coronary artery disease involving native coronary artery of native heart  I anticipate stopping Plavix in August which will be 1 year post right coronary artery stenting  Follow up Plan: Will not add any medication for BP at this time  His BP is monitored at cardiac rehab; we will consider adding medication if it is persistently elevated  Follow-up in 1 year  HPI: The patient presents for follow up regarding CAD (s/p ALVIN to RCA in Aug 2018)  He was previously experiencing some diarrhea which was suspected to be from the [place], but that has since resolved  He switched to Plavix anyway because of cost difference His BP is elevated today, but he has a history of being hypotensive  He is continuing with cardiac rehab  Blood pressure at cardiac rehab is fine  He denies chest pain, palpitations and syncope  Other cardiac testing:  Echo 10/15/18: No WMA  Preserved EF  Past Surgical History:   Procedure Laterality Date    BACK SURGERY      CORONARY ANGIOPLASTY WITH STENT PLACEMENT  08/08/2018    ALVIN to mid RCA (99%)      EYE SURGERY      SKIN BIOPSY       CMP:   Lab Results   Component Value Date     08/13/2018    K 4 1 08/13/2018     08/13/2018    CO2 27 08/13/2018    BUN 16 08/13/2018    CREATININE 1 16 08/13/2018    EGFR 63 08/13/2018       Lipid Profile:   Lab Results   Component Value Date    TRIG 143 08/09/2018    HDL 35 (L) 08/09/2018         Review of Systems   10  point ROS  was otherwise non pertinent or negative except as per HPI or as below  Gait: Normal    Objective:     /80   Pulse 60   Ht 5' 10" (1 778 m)   Wt 97 5 kg (215 lb)   BMI 30 85 kg/m²     PHYSICAL EXAM:    General:  Normal appearance in no distress  Eyes:  Anicteric  Oral mucosa:  Moist   Neck:  No JVD  Carotid upstrokes are brisk without bruits  No masses  Chest:  Clear to auscultation and percussion  Cardiac:  Normal PMI  Normal S1 and S2  No murmur gallop or rub  Abdomen:  Soft and nontender  No palpable organomegaly or aortic enlargement  Extremities:  No peripheral edema  Musculoskeletal:  Symmetric  Vascular:  Femoral pulses are brisk without bruits  Popliteal pulses are intact bilaterally  Pedal pulses are intact  Neuro:  Grossly symmetric  Psych:  Alert and oriented x3          Current Outpatient Prescriptions:     aspirin 81 mg chewable tablet, Chew 1 tablet (81 mg total) daily, Disp: 30 tablet, Rfl: 0    atorvastatin (LIPITOR) 80 mg tablet, Take 1 tablet (80 mg total) by mouth every evening, Disp: 90 tablet, Rfl: 3    cholecalciferol (VITAMIN D3) 1,000 units tablet, Take by mouth, Disp: , Rfl:     clopidogrel (PLAVIX) 75 mg tablet, Take 1 tablet (75 mg total) by mouth daily, Disp: 90 tablet, Rfl: 5    folic acid (FOLVITE) 1 mg tablet, Take by mouth, Disp: , Rfl:     methotrexate 2 5 mg tablet, Take 10 mg by mouth once a week  , Disp: , Rfl:     nitroglycerin (NITROSTAT) 0 4 mg SL tablet, Place 1 tablet (0 4 mg total) under the tongue every 5 (five) minutes as needed for chest pain, Disp: 90 tablet, Rfl: 0    oxyCODONE-acetaminophen (PERCOCET) 7 5-325 MG per tablet, Take 7 5 tablets by mouth 2 (two) times a day as needed, Disp: , Rfl:     pantoprazole (PROTONIX) 40 mg tablet, Take 40 mg by mouth daily, Disp: , Rfl: 0    tamsulosin (FLOMAX) 0 4 mg, Take by mouth, Disp: , Rfl:     nicotine (NICODERM CQ) 14 mg/24hr TD 24 hr patch, Place 1 patch on the skin daily (Patient not taking: Reported on 10/24/2018 ), Disp: 28 patch, Rfl: 0   Omeprazole 20 MG TBEC, Take by mouth, Disp: , Rfl:     ticagrelor (BRILINTA) 90 MG, Take 1 tablet (90 mg total) by mouth 2 (two) times a day for 24 days, Disp: 60 tablet, Rfl: 0  No Known Allergies  Past Medical History:   Diagnosis Date    Benign essential HTN 10/24/2018    COPD (chronic obstructive pulmonary disease) ([facility] )     Coronary artery disease     ALVIN to mid RCA 8/8/2018    GERD (gastroesophageal reflux disease)     History of transfusion     Polymyalgia (HCC)     ST elevation myocardial infarction (STEMI) of inferior wall ([facility] ) 08/08/2018       History   Smoking Status    Former Smoker    Packs/day: 0 50    Years: 50 00    Quit date: 8/8/2018   Smokeless Tobacco    Never Used

## 2018-10-24 NOTE — ASSESSMENT & PLAN NOTE
Blood pressure a bit high today but it has been quite fine at cardiac rehab several times per week  I am not going to make any changes therefore in his regimen

## 2018-10-26 ENCOUNTER — CLINICAL SUPPORT (OUTPATIENT)
Dept: CARDIAC REHAB | Facility: HOSPITAL | Age: 73
End: 2018-10-26
Attending: INTERNAL MEDICINE
Payer: MEDICARE

## 2018-10-26 DIAGNOSIS — Z95.5 S/P DRUG ELUTING CORONARY STENT PLACEMENT: ICD-10-CM

## 2018-10-26 PROCEDURE — 93798 PHYS/QHP OP CAR RHAB W/ECG: CPT

## 2018-10-26 NOTE — PROGRESS NOTES
Pt  States he had an appointment with his cardiologist Dr Amanda Almanzar and all is well  Pt  Does not have to see cardiologist again for 1 year

## 2018-10-29 ENCOUNTER — CLINICAL SUPPORT (OUTPATIENT)
Dept: CARDIAC REHAB | Facility: HOSPITAL | Age: 73
End: 2018-10-29
Attending: INTERNAL MEDICINE
Payer: MEDICARE

## 2018-10-29 DIAGNOSIS — Z95.5 S/P DRUG ELUTING CORONARY STENT PLACEMENT: ICD-10-CM

## 2018-10-29 PROCEDURE — 93798 PHYS/QHP OP CAR RHAB W/ECG: CPT

## 2018-10-31 ENCOUNTER — CLINICAL SUPPORT (OUTPATIENT)
Dept: CARDIAC REHAB | Facility: HOSPITAL | Age: 73
End: 2018-10-31
Attending: INTERNAL MEDICINE
Payer: MEDICARE

## 2018-10-31 DIAGNOSIS — Z95.5 S/P DRUG ELUTING CORONARY STENT PLACEMENT: ICD-10-CM

## 2018-10-31 PROCEDURE — 93798 PHYS/QHP OP CAR RHAB W/ECG: CPT

## 2018-10-31 NOTE — PROGRESS NOTES
Pt performs well in Cardiac rehab, reaches target heart rates remains asymptomatic recovers within appropriate time frame x 2 min

## 2018-11-02 ENCOUNTER — CLINICAL SUPPORT (OUTPATIENT)
Dept: CARDIAC REHAB | Facility: HOSPITAL | Age: 73
End: 2018-11-02
Attending: INTERNAL MEDICINE
Payer: MEDICARE

## 2018-11-02 DIAGNOSIS — Z95.5 S/P DRUG ELUTING CORONARY STENT PLACEMENT: ICD-10-CM

## 2018-11-02 PROCEDURE — 93798 PHYS/QHP OP CAR RHAB W/ECG: CPT

## 2018-11-05 ENCOUNTER — CLINICAL SUPPORT (OUTPATIENT)
Dept: CARDIAC REHAB | Facility: HOSPITAL | Age: 73
End: 2018-11-05
Attending: INTERNAL MEDICINE
Payer: MEDICARE

## 2018-11-05 DIAGNOSIS — Z95.5 S/P DRUG ELUTING CORONARY STENT PLACEMENT: ICD-10-CM

## 2018-11-05 PROCEDURE — 93798 PHYS/QHP OP CAR RHAB W/ECG: CPT

## 2018-11-07 ENCOUNTER — CLINICAL SUPPORT (OUTPATIENT)
Dept: CARDIAC REHAB | Facility: HOSPITAL | Age: 73
End: 2018-11-07
Attending: INTERNAL MEDICINE
Payer: MEDICARE

## 2018-11-07 DIAGNOSIS — Z95.5 S/P DRUG ELUTING CORONARY STENT PLACEMENT: ICD-10-CM

## 2018-11-07 PROCEDURE — 93798 PHYS/QHP OP CAR RHAB W/ECG: CPT

## 2018-11-09 ENCOUNTER — CLINICAL SUPPORT (OUTPATIENT)
Dept: CARDIAC REHAB | Facility: HOSPITAL | Age: 73
End: 2018-11-09
Attending: INTERNAL MEDICINE
Payer: MEDICARE

## 2018-11-09 DIAGNOSIS — Z95.5 STENTED CORONARY ARTERY: ICD-10-CM

## 2018-11-09 PROCEDURE — 93798 PHYS/QHP OP CAR RHAB W/ECG: CPT

## 2018-11-09 NOTE — PROGRESS NOTES
Cardiac Rehabilitation Plan of Care   60 Day      Today's date: 2018  Visits:   Patient name: Dania Helm      : 1945  Age: 68 y o  MRN: [id number]  Referring Physician: Nikki Friedman  Provider: [facility]  Clinician: Felisha Gardner BS, PTA    Dx: STEMI and drug eluting stent placement mid RCA  Date of onset: 18      SUMMARY OF PROGRESS:  Patient has been very compliant attending his CR sessions  Patient has resumed his ADLs w/out difficulty or symptoms  Patient is able to ambulate longer distances w/out symptoms or extreme fatigue  Pt  Able to travel, socialize with friends  Patient exhibits correct hemodynamic responses to activity  Patient gives excellent effort entire session  Medication compliance: Yes   Comments: Patient admits to 100% medication compliance  Fall Risk: Low   Comments: Patient exhibits Good dynamic standing balance and 5/5 BLE strength    EKG changes: No signs of ectopy or ischemia      EXERCISE ASSESSMENT and PLAN    Current Exercise Program in Rehab:       Frequency: 3/week        Minutes: 55-60       METS: 4 5            HR: 20-30 >RHR   RPE: 4-5         Modalities: Treadmill, UBE, Elliptical, Lifecycle     Exercise Plan/Progression:    Frequency: 3/week   Minutes: 55-60   METS: > 4   HR: 20-30 >RHR   RPE:4-5   Modalities: Treadmill, UBE, Elliptical, Lifecycle    Strength trainin-3 days / week   Modalities: Leg Press, Chest Press, Rower    Progressing:  Yes    Home Exercise: Patient has increased his overall activity level at home, socializing and traveling more  Encouraged walking program days off therapy      Goals: 10% improvement in functional capacity, Improved 6MWT distance by 10%, Resume ADLs with increased strength and Exercise 5 days/wk, >150mins/wk  Education: Benefit of exercise for CAD risk factors, home exercise guidelines, signs and sxs and RPE scale   Plan:education on home exercise guidelines and home exercise 30+ mins 2 days opposite CR  Readiness to change: Action      NUTRITION ASSESSMENT AND PLAN    Weight control:    Starting weight: 200 lb   Current weight:   200 lb  Diabetes: N/A  Lipid management: Discussed diet and lipid management  Goals:LDL <100, HDL >40, TRG <150, CHOL <200 and Improved Rate Your Plate score  >38  Education: heart healthy eating  low sodium diet  hydration  diet and lipid management  healthy choices while dining out  Progressing:Yes  Plan: Increase whole grains, increase fruits/vegs and Reduce added sugars <25g/day  Readiness to change: Action      PSYCHOSOCIAL ASSESSMENT AND PLAN    Emotional:              1-4 = Minimal Depression  Self-reported stress level: 3   Social support: Excellent  Goals:  improved [facility] QOL < 27, PHQ-9 - reduced severity by one level, Physical Fitness in [facility] Score < 3, Daily Activity in [facility] Score < 3, Overall Health in [facility] Score < 3, Quality of Life in [facility] Score < 3  and Change in Health in [facility] Score < 3   Education: signs/sxs of depression, benefits of positive support system and coping mechanisms  Progressing:Yes  Plan: PHQ-9 >5 will refer to MD and Practice relaxation techniques  Readiness to change: Action      OTHER CORE COMPONENTS     Tobacco:   History   Smoking Status    Former Smoker    Packs/day: 0 50    Years: 50 00    Quit date: 2018   Smokeless Tobacco    Never Used     Blood pressure:    Restin/60   Exercise: 170/60 Peak    Recovery:130/60    Goals: consistent BP < 130/80, reduced dietary sodium <2300mg and consistent exercise >150 mins/wk  Education:  understanding HTN and CAD and low sodium diet and HTN  Progressing:Yes  Plan: Class: Understanding Heart Disease and Class: Common Heart Medications  Readiness to change: Action

## 2018-11-09 NOTE — PROGRESS NOTES
Pt  Performs well during cardiac rehab session  Pt  Joshua Fire target heart rates, remains asymptomatic and demonstrates a normal hemodynamic response to exercise

## 2018-11-12 ENCOUNTER — CLINICAL SUPPORT (OUTPATIENT)
Dept: CARDIAC REHAB | Facility: HOSPITAL | Age: 73
End: 2018-11-12
Attending: INTERNAL MEDICINE
Payer: MEDICARE

## 2018-11-12 DIAGNOSIS — Z95.5 S/P DRUG ELUTING CORONARY STENT PLACEMENT: ICD-10-CM

## 2018-11-12 PROCEDURE — 93798 PHYS/QHP OP CAR RHAB W/ECG: CPT

## 2018-11-12 NOTE — PROGRESS NOTES
Pt reports total body weight = 210#  Pt  Tolerated today's exercise session well  Pt  Demonstrated a normal hemodynamic response to prescribed exercise  Pt  asymptomatic the entire session

## 2018-11-14 ENCOUNTER — CLINICAL SUPPORT (OUTPATIENT)
Dept: CARDIAC REHAB | Facility: HOSPITAL | Age: 73
End: 2018-11-14
Attending: INTERNAL MEDICINE
Payer: MEDICARE

## 2018-11-14 DIAGNOSIS — Z95.5 S/P DRUG ELUTING CORONARY STENT PLACEMENT: ICD-10-CM

## 2018-11-14 PROCEDURE — 93798 PHYS/QHP OP CAR RHAB W/ECG: CPT

## 2018-11-19 ENCOUNTER — CLINICAL SUPPORT (OUTPATIENT)
Dept: CARDIAC REHAB | Facility: HOSPITAL | Age: 73
End: 2018-11-19
Attending: INTERNAL MEDICINE
Payer: MEDICARE

## 2018-11-19 DIAGNOSIS — Z95.5 S/P DRUG ELUTING CORONARY STENT PLACEMENT: ICD-10-CM

## 2018-11-19 PROCEDURE — 93798 PHYS/QHP OP CAR RHAB W/ECG: CPT

## 2018-11-21 ENCOUNTER — CLINICAL SUPPORT (OUTPATIENT)
Dept: CARDIAC REHAB | Facility: HOSPITAL | Age: 73
End: 2018-11-21
Attending: INTERNAL MEDICINE
Payer: MEDICARE

## 2018-11-21 DIAGNOSIS — Z95.5 S/P DRUG ELUTING CORONARY STENT PLACEMENT: ICD-10-CM

## 2018-11-21 PROCEDURE — 93798 PHYS/QHP OP CAR RHAB W/ECG: CPT

## 2018-11-23 ENCOUNTER — CLINICAL SUPPORT (OUTPATIENT)
Dept: CARDIAC REHAB | Facility: HOSPITAL | Age: 73
End: 2018-11-23
Attending: INTERNAL MEDICINE
Payer: MEDICARE

## 2018-11-23 DIAGNOSIS — Z95.5 S/P DRUG ELUTING CORONARY STENT PLACEMENT: ICD-10-CM

## 2018-11-23 PROCEDURE — 93798 PHYS/QHP OP CAR RHAB W/ECG: CPT | Performed by: PHYSICAL THERAPIST

## 2018-11-26 ENCOUNTER — CLINICAL SUPPORT (OUTPATIENT)
Dept: CARDIAC REHAB | Facility: HOSPITAL | Age: 73
End: 2018-11-26
Attending: INTERNAL MEDICINE
Payer: MEDICARE

## 2018-11-26 DIAGNOSIS — Z95.5 S/P DRUG ELUTING CORONARY STENT PLACEMENT: ICD-10-CM

## 2018-11-26 PROCEDURE — 93798 PHYS/QHP OP CAR RHAB W/ECG: CPT

## 2018-11-28 ENCOUNTER — CLINICAL SUPPORT (OUTPATIENT)
Dept: CARDIAC REHAB | Facility: HOSPITAL | Age: 73
End: 2018-11-28
Attending: INTERNAL MEDICINE
Payer: MEDICARE

## 2018-11-28 DIAGNOSIS — Z95.5 STENTED CORONARY ARTERY: ICD-10-CM

## 2018-11-28 PROCEDURE — 93798 PHYS/QHP OP CAR RHAB W/ECG: CPT

## 2018-11-30 ENCOUNTER — CLINICAL SUPPORT (OUTPATIENT)
Dept: CARDIAC REHAB | Facility: HOSPITAL | Age: 73
End: 2018-11-30
Attending: INTERNAL MEDICINE
Payer: MEDICARE

## 2018-11-30 DIAGNOSIS — Z95.5 STENTED CORONARY ARTERY: ICD-10-CM

## 2018-11-30 PROCEDURE — 93798 PHYS/QHP OP CAR RHAB W/ECG: CPT

## 2018-12-03 ENCOUNTER — CLINICAL SUPPORT (OUTPATIENT)
Dept: CARDIAC REHAB | Facility: HOSPITAL | Age: 73
End: 2018-12-03
Attending: INTERNAL MEDICINE
Payer: MEDICARE

## 2018-12-03 DIAGNOSIS — Z95.5 S/P DRUG ELUTING CORONARY STENT PLACEMENT: ICD-10-CM

## 2018-12-03 PROCEDURE — 93798 PHYS/QHP OP CAR RHAB W/ECG: CPT

## 2018-12-05 ENCOUNTER — CLINICAL SUPPORT (OUTPATIENT)
Dept: CARDIAC REHAB | Facility: HOSPITAL | Age: 73
End: 2018-12-05
Attending: INTERNAL MEDICINE
Payer: MEDICARE

## 2018-12-05 DIAGNOSIS — Z95.5 S/P DRUG ELUTING CORONARY STENT PLACEMENT: ICD-10-CM

## 2018-12-05 PROCEDURE — 93798 PHYS/QHP OP CAR RHAB W/ECG: CPT

## 2018-12-05 NOTE — PROGRESS NOTES
Pt displayed normal hemodynamic responses to exercise  Pt  remained asymptomatic the entire session

## 2018-12-07 ENCOUNTER — CLINICAL SUPPORT (OUTPATIENT)
Dept: CARDIAC REHAB | Facility: HOSPITAL | Age: 73
End: 2018-12-07
Attending: INTERNAL MEDICINE
Payer: MEDICARE

## 2018-12-07 DIAGNOSIS — Z95.5 S/P DRUG ELUTING CORONARY STENT PLACEMENT: ICD-10-CM

## 2018-12-07 PROCEDURE — 93798 PHYS/QHP OP CAR RHAB W/ECG: CPT

## 2018-12-07 NOTE — PROGRESS NOTES
Cardiac Rehabilitation Plan of Care   90 Day      Today's date: 2018  Visits: 35/36  Patient name: Pedro Yusuf      : 1945  Age: 68 y o  MRN: [id number]  Referring Physician: Beto Alexander  Provider: [facility]  Clinician: Katie Arango BS, PTA    Dx: STEMI and drug eluting stent placement mid RCA  Date of onset: 18      SUMMARY OF PROGRESS:  Patient has been very compliant attending his CR sessions  Patient has resumed his ADLs w/out difficulty or symptoms  Patient is able to ambulate longer distances w/out symptoms or extreme fatigue  Pt  Able to travel, socialize with friends  Patient exhibits correct hemodynamic responses to activity  Patient gives excellent effort entire session  Pt  Is preparing for discharge to home exercise program  Pt  Is aware of target heart rates an demonstrates independence monitoring his pulse  Medication compliance: Yes   Comments: Patient admits to 100% medication compliance  Fall Risk: Low   Comments: Patient exhibits Good dynamic standing balance and 5/5 BLE strength    EKG changes: No signs of ectopy or ischemia      EXERCISE ASSESSMENT and PLAN    Current Exercise Program in Rehab:       Frequency: 3/week        Minutes: 55-60       METS: 4 5            HR: 20-30 >RHR   RPE: 4-5         Modalities: Treadmill, UBE, Elliptical, Lifecycle     Exercise Plan/Progression:    Frequency: 3/week   Minutes: 55-60   METS: > 4   HR: 20-30 >RHR   RPE:4-5   Modalities: Treadmill, UBE, Elliptical, Lifecycle    Strength trainin-3 days / week   Modalities: Leg Press, Chest Press, Rower    Progressing:  Yes    Home Exercise: Patient has increased his overall activity level at home, socializing and traveling more  Encouraged walking program days off therapy      Goals: 10% improvement in functional capacity, Improved 6MWT distance by 10%, Resume ADLs with increased strength and Exercise 5 days/wk, >150mins/wk  Education: Benefit of exercise for CAD risk factors, home exercise guidelines, signs and sxs and RPE scale   Plan:education on home exercise guidelines and home exercise 30+ mins 2 days opposite CR  Readiness to change: Action      NUTRITION ASSESSMENT AND PLAN    Weight control:    Starting weight: 200 lb   Current weight:   200 lb  Diabetes: N/A  Lipid management: Discussed diet and lipid management  Goals:LDL <100, HDL >40, TRG <150, CHOL <200 and Improved Rate Your Plate score  >58  Education: heart healthy eating  low sodium diet  hydration  diet and lipid management  healthy choices while dining out  Progressing:Yes  Plan: Increase whole grains, increase fruits/vegs and Reduce added sugars <25g/day  Readiness to change: Action      PSYCHOSOCIAL ASSESSMENT AND PLAN    Emotional:              1-4 = Minimal Depression  Self-reported stress level: 3   Social support: Excellent  Goals:  improved [facility] QOL < 27, PHQ-9 - reduced severity by one level, Physical Fitness in [facility] Score < 3, Daily Activity in [facility] Score < 3, Overall Health in [facility] Score < 3, Quality of Life in [facility] Score < 3  and Change in Health in [facility] Score < 3   Education: signs/sxs of depression, benefits of positive support system and coping mechanisms  Progressing:Yes  Plan: PHQ-9 >5 will refer to MD and Practice relaxation techniques  Readiness to change: Action      OTHER CORE COMPONENTS     Tobacco:   History   Smoking Status    Former Smoker    Packs/day: 0 50    Years: 50 00    Quit date: 2018   Smokeless Tobacco    Never Used     Blood pressure:    Restin/70   Exercise: 152/60 Peak    Recovery:110/70    Goals: consistent BP < 130/80, reduced dietary sodium <2300mg and consistent exercise >150 mins/wk  Education:  understanding HTN and CAD and low sodium diet and HTN  Progressing:Yes  Plan: Class: Understanding Heart Disease and Class: Common Heart Medications  Readiness to change: Action

## 2018-12-10 ENCOUNTER — CLINICAL SUPPORT (OUTPATIENT)
Dept: CARDIAC REHAB | Facility: HOSPITAL | Age: 73
End: 2018-12-10
Attending: INTERNAL MEDICINE
Payer: MEDICARE

## 2018-12-10 VITALS — HEIGHT: 70 IN | WEIGHT: 210 LBS | BODY MASS INDEX: 30.06 KG/M2

## 2018-12-10 DIAGNOSIS — Z95.5 S/P DRUG ELUTING CORONARY STENT PLACEMENT: ICD-10-CM

## 2018-12-10 PROCEDURE — 93798 PHYS/QHP OP CAR RHAB W/ECG: CPT

## 2018-12-10 NOTE — PROGRESS NOTES
Joseph Hallman   1945          Pre Post % Change Goal   Date:       Physical       Sub Max ETT (mets)    10% increase   6MWT (feet) 1000  1600  10% increase   [facility] Dyspnea Score 0  0  5 pt decrease   Supplemental O2 use (L) no  no     [employer] [facility] (est peak O2)   36 7 Total Score: 50 7     Peak exercise CR/VT (mets)    40% increase   Emotional       PHQ9 (> 10 refer to MD)  PHQ-9 Total Score: 0    4 pt decrease   [place] (lower score = improvement)       Total 19 Total: 15  < 27   Feelings  Feelings: Not at all  < 3   Physical Fitness  Physical Fitness: Moderate  < 3   Social Support  Social Support: Yes, as much as I needed  < 3   Daily Activities  Daily Activity: No difficulty at all  < 3   Social Activities  Social Activities: Not at all  < 3   Pain  Pain: None  < 3   Overall Health  Overall Health: Very good  < 3   Quality of Life  Quality of Life: Pretty good  < 3   Change in Health  Change in Health: about the same  < 3   Dietary       Rate your plate     > 58   Measurements       Weight 210 pounds Weight - Scale: 95 3 kg (210 lb)    2 5 - 5%   BMI  Body mass index is 30 13 kg/m²  19 - 25   Waist Circ  < 36 M / < 35 F   % Body fat     < 25 M / < 33 F   BP left arm               (systolic)    < 970   (diastolic)    < 90   Smoking #/day  (if applicable)    0   Lipids/Glucose (Date)       Total cholesterol    50 - 200   Triglycerides    < 150   HDL    40 - 60   LDL    < 100   A1C    4 0 - 5 6%   Fasting BG       Patient completed 36 session Phase II Cardiac Rehab    D/c to Phase III Maintenance program

## 2018-12-12 ENCOUNTER — APPOINTMENT (OUTPATIENT)
Dept: CARDIAC REHAB | Facility: HOSPITAL | Age: 73
End: 2018-12-12
Attending: INTERNAL MEDICINE
Payer: MEDICARE

## 2019-04-05 ENCOUNTER — HOSPITAL ENCOUNTER (EMERGENCY)
Facility: HOSPITAL | Age: 74
Discharge: HOME | End: 2019-04-05
Payer: MEDICARE

## 2019-04-05 ENCOUNTER — APPOINTMENT (EMERGENCY)
Dept: CT IMAGING | Facility: HOSPITAL | Age: 74
End: 2019-04-05
Payer: MEDICARE

## 2019-04-05 ENCOUNTER — APPOINTMENT (EMERGENCY)
Dept: RADIOLOGY | Facility: HOSPITAL | Age: 74
End: 2019-04-05
Payer: MEDICARE

## 2019-04-05 VITALS
OXYGEN SATURATION: 98 % | SYSTOLIC BLOOD PRESSURE: 119 MMHG | HEIGHT: 70 IN | BODY MASS INDEX: 29.35 KG/M2 | WEIGHT: 205 LBS | DIASTOLIC BLOOD PRESSURE: 81 MMHG | HEART RATE: 73 BPM | TEMPERATURE: 98.6 F | RESPIRATION RATE: 16 BRPM

## 2019-04-05 DIAGNOSIS — R42 VERTIGO: Primary | ICD-10-CM

## 2019-04-05 LAB
ALBUMIN SERPL BCP-MCNC: 4.4 G/DL (ref 3.5–5.7)
ALP SERPL-CCNC: 56 U/L (ref 55–165)
ALT SERPL W P-5'-P-CCNC: 26 U/L (ref 7–52)
ANION GAP SERPL CALCULATED.3IONS-SCNC: 8 MMOL/L (ref 4–13)
APTT PPP: 37 SECONDS (ref 26–38)
AST SERPL W P-5'-P-CCNC: 17 U/L (ref 13–39)
ATRIAL RATE: 62 BPM
BASOPHILS # BLD AUTO: 0 THOUSANDS/ΜL (ref 0–0.1)
BASOPHILS NFR BLD AUTO: 1 % (ref 0–2)
BILIRUB SERPL-MCNC: 0.7 MG/DL (ref 0.2–1)
BUN SERPL-MCNC: 14 MG/DL (ref 7–25)
CALCIUM SERPL-MCNC: 9.3 MG/DL (ref 8.6–10.5)
CHLORIDE SERPL-SCNC: 103 MMOL/L (ref 98–107)
CO2 SERPL-SCNC: 28 MMOL/L (ref 21–31)
CREAT SERPL-MCNC: 1.07 MG/DL (ref 0.7–1.3)
EOSINOPHIL # BLD AUTO: 0.1 THOUSAND/ΜL (ref 0–0.61)
EOSINOPHIL NFR BLD AUTO: 2 % (ref 0–5)
ERYTHROCYTE [DISTWIDTH] IN BLOOD BY AUTOMATED COUNT: 13.1 % (ref 11.5–14.5)
GFR SERPL CREATININE-BSD FRML MDRD: 68 ML/MIN/1.73SQ M
GLUCOSE SERPL-MCNC: 130 MG/DL (ref 65–140)
GLUCOSE SERPL-MCNC: 142 MG/DL (ref 65–99)
HCT VFR BLD AUTO: 40.7 % (ref 42–47)
HGB BLD-MCNC: 14.1 G/DL (ref 14–18)
INR PPP: 0.99 (ref 0.9–1.5)
LYMPHOCYTES # BLD AUTO: 1.9 THOUSANDS/ΜL (ref 0.6–4.47)
LYMPHOCYTES NFR BLD AUTO: 42 % (ref 21–51)
MCH RBC QN AUTO: 32.5 PG (ref 26–34)
MCHC RBC AUTO-ENTMCNC: 34.7 G/DL (ref 31–37)
MCV RBC AUTO: 94 FL (ref 81–99)
MONOCYTES # BLD AUTO: 0.4 THOUSAND/ΜL (ref 0.17–1.22)
MONOCYTES NFR BLD AUTO: 9 % (ref 2–12)
NEUTROPHILS # BLD AUTO: 2.1 THOUSANDS/ΜL (ref 1.4–6.5)
NEUTS SEG NFR BLD AUTO: 46 % (ref 42–75)
P AXIS: 54 DEGREES
PLATELET # BLD AUTO: 179 THOUSANDS/UL (ref 149–390)
PMV BLD AUTO: 7.9 FL (ref 8.6–11.7)
POTASSIUM SERPL-SCNC: 4 MMOL/L (ref 3.5–5.5)
PR INTERVAL: 116 MS
PROT SERPL-MCNC: 6.5 G/DL (ref 6.4–8.9)
PROTHROMBIN TIME: 11.5 SECONDS (ref 10.2–13)
QRS AXIS: 41 DEGREES
QRSD INTERVAL: 84 MS
QT INTERVAL: 400 MS
QTC INTERVAL: 406 MS
RBC # BLD AUTO: 4.35 MILLION/UL (ref 4.3–5.9)
SODIUM SERPL-SCNC: 139 MMOL/L (ref 134–143)
T WAVE AXIS: 16 DEGREES
TROPONIN I SERPL-MCNC: <0.03 NG/ML
VENTRICULAR RATE: 62 BPM
WBC # BLD AUTO: 4.5 THOUSAND/UL (ref 4.8–10.8)

## 2019-04-05 PROCEDURE — 70450 CT HEAD/BRAIN W/O DYE: CPT

## 2019-04-05 PROCEDURE — 85610 PROTHROMBIN TIME: CPT

## 2019-04-05 PROCEDURE — 71045 X-RAY EXAM CHEST 1 VIEW: CPT

## 2019-04-05 PROCEDURE — 85730 THROMBOPLASTIN TIME PARTIAL: CPT

## 2019-04-05 PROCEDURE — 84484 ASSAY OF TROPONIN QUANT: CPT

## 2019-04-05 PROCEDURE — 82948 REAGENT STRIP/BLOOD GLUCOSE: CPT

## 2019-04-05 PROCEDURE — 80053 COMPREHEN METABOLIC PANEL: CPT

## 2019-04-05 PROCEDURE — 99285 EMERGENCY DEPT VISIT HI MDM: CPT

## 2019-04-05 PROCEDURE — 36415 COLL VENOUS BLD VENIPUNCTURE: CPT

## 2019-04-05 PROCEDURE — 93010 ELECTROCARDIOGRAM REPORT: CPT | Performed by: INTERNAL MEDICINE

## 2019-04-05 PROCEDURE — 93005 ELECTROCARDIOGRAM TRACING: CPT

## 2019-04-05 PROCEDURE — 96374 THER/PROPH/DIAG INJ IV PUSH: CPT

## 2019-04-05 PROCEDURE — 85025 COMPLETE CBC W/AUTO DIFF WBC: CPT

## 2019-04-05 RX ORDER — LORAZEPAM 2 MG/ML
0.5 INJECTION INTRAMUSCULAR ONCE
Status: COMPLETED | OUTPATIENT
Start: 2019-04-05 | End: 2019-04-05

## 2019-04-05 RX ORDER — MECLIZINE HYDROCHLORIDE 25 MG/1
25 TABLET ORAL 3 TIMES DAILY PRN
Qty: 12 TABLET | Refills: 0 | Status: SHIPPED | OUTPATIENT
Start: 2019-04-05 | End: 2019-12-11 | Stop reason: ALTCHOICE

## 2019-04-05 RX ADMIN — LORAZEPAM 0.5 MG: 2 INJECTION INTRAMUSCULAR; INTRAVENOUS at 11:26

## 2019-05-22 ENCOUNTER — OFFICE VISIT (OUTPATIENT)
Dept: URGENT CARE | Facility: CLINIC | Age: 74
End: 2019-05-22
Payer: MEDICARE

## 2019-05-22 VITALS
DIASTOLIC BLOOD PRESSURE: 70 MMHG | SYSTOLIC BLOOD PRESSURE: 155 MMHG | OXYGEN SATURATION: 97 % | TEMPERATURE: 98.1 F | HEART RATE: 53 BPM | RESPIRATION RATE: 16 BRPM

## 2019-05-22 DIAGNOSIS — B02.9 HERPES ZOSTER WITHOUT COMPLICATION: Primary | ICD-10-CM

## 2019-05-22 PROCEDURE — G0463 HOSPITAL OUTPT CLINIC VISIT: HCPCS | Performed by: NURSE PRACTITIONER

## 2019-05-22 PROCEDURE — 99213 OFFICE O/P EST LOW 20 MIN: CPT | Performed by: NURSE PRACTITIONER

## 2019-05-22 RX ORDER — VALACYCLOVIR HYDROCHLORIDE 1 G/1
1000 TABLET, FILM COATED ORAL 3 TIMES DAILY
Qty: 21 TABLET | Refills: 0 | Status: SHIPPED | OUTPATIENT
Start: 2019-05-22 | End: 2019-12-11 | Stop reason: ALTCHOICE

## 2019-09-19 DIAGNOSIS — E78.2 MIXED HYPERLIPIDEMIA: ICD-10-CM

## 2019-09-19 DIAGNOSIS — I21.19 ACUTE ST ELEVATION MYOCARDIAL INFARCTION (STEMI) OF INFERIOR WALL (HCC): ICD-10-CM

## 2019-09-19 RX ORDER — ATORVASTATIN CALCIUM 80 MG/1
TABLET, FILM COATED ORAL
Qty: 90 TABLET | Refills: 3 | Status: SHIPPED | OUTPATIENT
Start: 2019-09-19 | End: 2020-09-15

## 2019-11-06 DIAGNOSIS — I25.10 CORONARY ARTERY DISEASE INVOLVING NATIVE CORONARY ARTERY OF NATIVE HEART, ANGINA PRESENCE UNSPECIFIED: ICD-10-CM

## 2019-11-06 RX ORDER — CLOPIDOGREL BISULFATE 75 MG/1
TABLET ORAL
Qty: 90 TABLET | Refills: 5 | Status: SHIPPED | OUTPATIENT
Start: 2019-11-06 | End: 2021-01-29

## 2019-12-10 ENCOUNTER — HOSPITAL ENCOUNTER (OUTPATIENT)
Dept: MRI IMAGING | Facility: HOSPITAL | Age: 74
Discharge: HOME | End: 2019-12-10
Payer: MEDICARE

## 2019-12-10 DIAGNOSIS — H92.02 OTALGIA, LEFT EAR: ICD-10-CM

## 2019-12-10 DIAGNOSIS — R51.9 CHRONIC NONINTRACTABLE HEADACHE, UNSPECIFIED HEADACHE TYPE: ICD-10-CM

## 2019-12-10 DIAGNOSIS — G89.29 CHRONIC NONINTRACTABLE HEADACHE, UNSPECIFIED HEADACHE TYPE: ICD-10-CM

## 2019-12-10 PROCEDURE — 70553 MRI BRAIN STEM W/O & W/DYE: CPT

## 2019-12-10 PROCEDURE — A9585 GADOBUTROL INJECTION: HCPCS | Performed by: SPECIALIST

## 2019-12-10 RX ADMIN — GADOBUTROL 9 ML: 604.72 INJECTION INTRAVENOUS at 13:28

## 2019-12-11 ENCOUNTER — OFFICE VISIT (OUTPATIENT)
Dept: CARDIOLOGY CLINIC | Facility: CLINIC | Age: 74
End: 2019-12-11
Payer: MEDICARE

## 2019-12-11 VITALS
DIASTOLIC BLOOD PRESSURE: 82 MMHG | SYSTOLIC BLOOD PRESSURE: 146 MMHG | HEART RATE: 60 BPM | WEIGHT: 220 LBS | BODY MASS INDEX: 31.5 KG/M2 | HEIGHT: 70 IN

## 2019-12-11 DIAGNOSIS — I10 BENIGN ESSENTIAL HTN: ICD-10-CM

## 2019-12-11 DIAGNOSIS — I25.10 CORONARY ARTERY DISEASE INVOLVING NATIVE CORONARY ARTERY OF NATIVE HEART WITHOUT ANGINA PECTORIS: Primary | ICD-10-CM

## 2019-12-11 DIAGNOSIS — E78.2 MIXED HYPERLIPIDEMIA: ICD-10-CM

## 2019-12-11 PROCEDURE — 99214 OFFICE O/P EST MOD 30 MIN: CPT | Performed by: PHYSICIAN ASSISTANT

## 2019-12-11 NOTE — PROGRESS NOTES
[facility]     Elvin Michelle / 76 y o  male / : 1945 / MRN: [id number]  Date of Visit: 19    ASSESSMENT/PLAN  1  CAD   · S/p ALVIN to RCA 18   · No recent angina  · Continue Plavix, atorvastatin    2  HTN  · Borderline today in the office but has been averaging 120-130s/80s at home  · Not currently on antihypertensives    3  HLD - tolerating high intensity statin    Follow up in 1 year    SUBJECTIVE:  HPI: Elvin Michelle is a 76 y o  male who presents for follow-up regarding CAD, HTN, HLD  He is feeling well and has no complaints  Prior to MI in 2018 he became acutely diaphoretic and clammy  He never had chest pain  He shoveled snow last week without issue  He took DAPT after stenting but is currently only on Plavix  He developed ulcers on multiple occasions while on ASA  Cardiac testing:    Echo 10/15/18 - EF 55-65%   Mild aortic root dilation    No Known Allergies      Current Outpatient Medications:     atorvastatin (LIPITOR) 80 mg tablet, take 1 tablet by mouth every evening, Disp: 90 tablet, Rfl: 3    cholecalciferol (VITAMIN D3) 1,000 units tablet, Take 2,000 Units by mouth , Disp: , Rfl:     clopidogrel (PLAVIX) 75 mg tablet, take 1 tablet by mouth once daily, Disp: 90 tablet, Rfl: 5    folic acid (FOLVITE) 1 mg tablet, Take by mouth 3 (three) times a day , Disp: , Rfl:     methotrexate 2 5 mg tablet, Take 10 mg by mouth once a week  , Disp: , Rfl:     oxyCODONE-acetaminophen (PERCOCET) 7 5-325 MG per tablet, Take 7 5 tablets by mouth 2 (two) times a day as needed, Disp: , Rfl:     pantoprazole (PROTONIX) 40 mg tablet, Take 40 mg by mouth daily, Disp: , Rfl: 0    tamsulosin (FLOMAX) 0 4 mg, Take by mouth, Disp: , Rfl:     nitroglycerin (NITROSTAT) 0 4 mg SL tablet, Place 1 tablet (0 4 mg total) under the tongue every 5 (five) minutes as needed for chest pain (Patient not taking: Reported on 2019), Disp: 90 tablet, Rfl: 0  No current facility-administered medications for this visit  Past Medical History:   Diagnosis Date    Benign essential HTN 10/24/2018    [facility]     Coronary artery disease     ALVIN to mid RCA 2018    GERD (gastroesophageal reflux disease)     History of transfusion     Polymyalgia (HCC)     ST elevation myocardial infarction (STEMI) of inferior wall ([facility] [address] ) 2018       Family History   Problem Relation Age of Onset    Heart attack Mother     Heart disease Brother        Past Surgical History:   Procedure Laterality Date    BACK SURGERY      CORONARY ANGIOPLASTY WITH STENT PLACEMENT  2018    ALVIN to mid RCA (99%)   EYE SURGERY      SKIN BIOPSY         Social History     Socioeconomic History    Marital status: /Civil Union     Spouse name: Not on file    Number of children: Not on file    Years of education: Not on file    Highest education level: Not on file   Occupational History    Not on file   Social Needs    Financial resource strain: Not on file    Food insecurity:     Worry: Not on file     Inability: Not on file    Transportation needs:     Medical: Not on file     Non-medical: Not on file   Tobacco Use    Smoking status: Former Smoker     Packs/day: 0 50     Years: 50 00     Pack years: 25 00     Last attempt to quit: 2018     Years since quittin 3    Smokeless tobacco: Never Used    Tobacco comment: 1 cig  2-3 days   Substance and Sexual Activity    Alcohol use:  Yes     Alcohol/week: 2 0 standard drinks     Types: 2 Cans of beer per week     Frequency: Monthly or less    Drug use: No    Sexual activity: Not on file   Lifestyle    Physical activity:     Days per week: Not on file     Minutes per session: Not on file    Stress: Not on file   Relationships    Social connections:     Talks on phone: Not on file     Gets together: Not on file     Attends Faith service: Not on file     Active member of club or organization: Not on file     Attends meetings of clubs or organizations: Not on file     Relationship status: Not on file    Intimate partner violence:     Fear of current or ex partner: Not on file     Emotionally abused: Not on file     Physically abused: Not on file     Forced sexual activity: Not on file   Other Topics Concern    Not on file   Social History Narrative    Daily Caffeine Use- 3 cups of coffee, hot tea- 1 cup       Review of Systems   Constitution: Negative  HENT: Negative  Eyes: Negative  Cardiovascular: Positive for leg swelling (minimal leg edema x15 years)  Negative for chest pain, claudication, dyspnea on exertion, irregular heartbeat, near-syncope, orthopnea, palpitations, paroxysmal nocturnal dyspnea and syncope  Respiratory: Negative for cough, shortness of breath and wheezing  Endocrine: Negative  Skin: Negative  Musculoskeletal: Negative  Gastrointestinal: Negative  Genitourinary: Negative  Neurological: Negative for dizziness and light-headedness  Psychiatric/Behavioral: Negative  OBJECTIVE:  Vitals: /82   Pulse 60   Ht 5' 10" (1 778 m)   Wt 99 8 kg (220 lb)   BMI 31 57 kg/m²     Physical exam:   Physical Exam   Constitutional: He is oriented to person, place, and time  He appears well-developed and well-nourished  No distress  HENT:   Head: Normocephalic and atraumatic  Eyes: EOM are normal  No scleral icterus  Neck: Normal range of motion  Neck supple  No JVD present  Cardiovascular: Normal rate, regular rhythm, S1 normal and S2 normal    No murmur heard  Pulmonary/Chest: Effort normal and breath sounds normal  He has no wheezes  He has no rales  Abdominal: Soft  Bowel sounds are normal    Musculoskeletal: He exhibits edema (trace)  Neurological: He is alert and oriented to person, place, and time  Skin: Skin is warm and dry  Psychiatric: He has a normal mood and affect   His behavior is normal        Lab Results: Lab Results   Component Value Date    HGBA1C 5 9 08/09/2018     No results found for: CHOL  Lab Results   Component Value Date    HDL 35 (L) 08/09/2018    HDL 38 (L) 08/08/2018     Lab Results   Component Value Date    LDLCALC 83 08/09/2018    LDLCALC 104 08/08/2018     Lab Results   Component Value Date    TRIG 143 08/09/2018    TRIG 173 (H) 08/08/2018     No results found for: CHOLHDL

## 2020-09-15 DIAGNOSIS — I21.19 ACUTE ST ELEVATION MYOCARDIAL INFARCTION (STEMI) OF INFERIOR WALL (HCC): ICD-10-CM

## 2020-09-15 DIAGNOSIS — E78.2 MIXED HYPERLIPIDEMIA: ICD-10-CM

## 2020-09-15 RX ORDER — ATORVASTATIN CALCIUM 80 MG/1
TABLET, FILM COATED ORAL
Qty: 90 TABLET | Refills: 3 | Status: SHIPPED | OUTPATIENT
Start: 2020-09-15 | End: 2021-09-17

## 2021-01-13 ENCOUNTER — OFFICE VISIT (OUTPATIENT)
Dept: CARDIOLOGY CLINIC | Facility: CLINIC | Age: 76
End: 2021-01-13
Payer: MEDICARE

## 2021-01-13 VITALS
HEART RATE: 59 BPM | DIASTOLIC BLOOD PRESSURE: 86 MMHG | WEIGHT: 207.23 LBS | BODY MASS INDEX: 29.67 KG/M2 | SYSTOLIC BLOOD PRESSURE: 140 MMHG | HEIGHT: 70 IN

## 2021-01-13 DIAGNOSIS — I25.10 CORONARY ARTERY DISEASE INVOLVING NATIVE CORONARY ARTERY OF NATIVE HEART WITHOUT ANGINA PECTORIS: Primary | ICD-10-CM

## 2021-01-13 DIAGNOSIS — I10 BENIGN ESSENTIAL HTN: ICD-10-CM

## 2021-01-13 PROCEDURE — 99213 OFFICE O/P EST LOW 20 MIN: CPT | Performed by: INTERNAL MEDICINE

## 2021-01-13 PROCEDURE — 93000 ELECTROCARDIOGRAM COMPLETE: CPT | Performed by: INTERNAL MEDICINE

## 2021-01-13 NOTE — PROGRESS NOTES
Patient ID: Hiram Peabody is a 76 y o  male  Plan:      Coronary artery disease involving native coronary artery of native heart  Coronary stenting in August of 2018  Intolerant of aspirin  Continues on clopidogrel  Mixed hyperlipidemia  Tolerating high-intensity statin therapy  Follow up Plan/Other summary comments:  One year EKG and follow-up visit unless there are interval issues  HPI:   Patient is seen today in follow-up regarding the above  No chest pain or chest pressure since last year  He remains physically active  No change in exertional tolerance  To reiterate, in August of 2018 patient had acute coronary syndrome and underwent drug-eluting stent to the right coronary artery  He has had no coronary symptoms since then  He is on clopidogrel at present as he had GI issues on aspirin  Results for orders placed or performed in visit on 01/13/21   POCT ECG    Impression    NSR  Minor NSSTs  Most recent or relevant cardiac/vascular testing:      Echo 10/15/2018:  Normal EF  Mild aortic root dilation  Past Surgical History:   Procedure Laterality Date    BACK SURGERY      CORONARY ANGIOPLASTY WITH STENT PLACEMENT  08/08/2018    ALVIN to mid RCA (99%)   EYE SURGERY      SKIN BIOPSY       CMP:   Lab Results   Component Value Date    K 4 0 04/05/2019     04/05/2019    CO2 28 04/05/2019    BUN 14 04/05/2019    CREATININE 1 07 04/05/2019    EGFR 68 04/05/2019       Lipid Profile:   Lab Results   Component Value Date    TRIG 143 08/09/2018    HDL 35 (L) 08/09/2018         Review of Systems   10  point ROS  was otherwise non pertinent or negative except as per HPI or as below  Gait: Normal         Objective:     /86   Pulse 59   Ht 5' 10" (1 778 m)   Wt 94 kg (207 lb 3 7 oz)   BMI 29 73 kg/m²     PHYSICAL EXAM:    General:  Normal appearance in no distress  Eyes:  Anicteric  Oral mucosa:  Moist   Neck:  No JVD   Carotid upstrokes are brisk without bruits  No masses  Chest:  Clear to auscultation and percussion  Cardiac:  No palpable PMI  Normal S1 and S2  No murmur gallop or rub  Abdomen:  Soft and nontender  No palpable organomegaly or aortic enlargement  Extremities:  No peripheral edema  Musculoskeletal:  Symmetric  Vascular:  Femoral pulses are brisk without bruits  Popliteal pulses are intact bilaterally  Pedal pulses are intact  Neuro:  Grossly symmetric  Psych:  Alert and oriented x3          Current Outpatient Medications:     atorvastatin (LIPITOR) 80 mg tablet, take 1 tablet by mouth every evening, Disp: 90 tablet, Rfl: 3    cholecalciferol (VITAMIN D3) 1,000 units tablet, Take 2,000 Units by mouth , Disp: , Rfl:     clopidogrel (PLAVIX) 75 mg tablet, take 1 tablet by mouth once daily, Disp: 90 tablet, Rfl: 5    folic acid (FOLVITE) 1 mg tablet, Take 3 mg by mouth daily , Disp: , Rfl:     methotrexate 2 5 mg tablet, Take 10 mg by mouth once a week  , Disp: , Rfl:     nitroglycerin (NITROSTAT) 0 4 mg SL tablet, Place 1 tablet (0 4 mg total) under the tongue every 5 (five) minutes as needed for chest pain, Disp: 90 tablet, Rfl: 0    oxyCODONE-acetaminophen (PERCOCET) 7 5-325 MG per tablet, Take 7 5 tablets by mouth 2 (two) times a day as needed, Disp: , Rfl:     pantoprazole (PROTONIX) 40 mg tablet, Take 40 mg by mouth daily, Disp: , Rfl: 0    tamsulosin (FLOMAX) 0 4 mg, Take by mouth, Disp: , Rfl:   No Known Allergies  Past Medical History:   Diagnosis Date    Benign essential HTN 10/24/2018    COPD (chronic obstructive pulmonary disease) ([facility] [address] )     Coronary artery disease     ALVIN to mid RCA 8/8/2018    GERD (gastroesophageal reflux disease)     History of transfusion     Polymyalgia (HCC)     ST elevation myocardial infarction (STEMI) of inferior wall ([facility] [address] ) 08/08/2018           Social History     Tobacco Use   Smoking Status Former Smoker    Packs/day: 0 50    Years: 50 00    Pack years: 25 00    Quit date: 8/8/2018  Years since quittin 4   Smokeless Tobacco Never Used   Tobacco Comment    1 cig   2-3 days

## 2021-01-18 ENCOUNTER — IMMUNIZATIONS (OUTPATIENT)
Dept: FAMILY MEDICINE CLINIC | Facility: HOSPITAL | Age: 76
End: 2021-01-18

## 2021-01-18 DIAGNOSIS — Z23 ENCOUNTER FOR IMMUNIZATION: Primary | ICD-10-CM

## 2021-01-18 PROCEDURE — 91300 SARS-COV-2 / COVID-19 MRNA VACCINE (PFIZER-BIONTECH) 30 MCG: CPT

## 2021-01-18 PROCEDURE — 0001A SARS-COV-2 / COVID-19 MRNA VACCINE (PFIZER-BIONTECH) 30 MCG: CPT

## 2021-01-29 DIAGNOSIS — I25.10 CORONARY ARTERY DISEASE INVOLVING NATIVE CORONARY ARTERY OF NATIVE HEART, ANGINA PRESENCE UNSPECIFIED: ICD-10-CM

## 2021-01-29 RX ORDER — CLOPIDOGREL BISULFATE 75 MG/1
TABLET ORAL
Qty: 90 TABLET | Refills: 5 | Status: SHIPPED | OUTPATIENT
Start: 2021-01-29 | End: 2022-04-26

## 2021-02-10 ENCOUNTER — IMMUNIZATIONS (OUTPATIENT)
Dept: FAMILY MEDICINE CLINIC | Facility: HOSPITAL | Age: 76
End: 2021-02-10

## 2021-02-10 DIAGNOSIS — Z23 ENCOUNTER FOR IMMUNIZATION: Primary | ICD-10-CM

## 2021-02-10 PROCEDURE — 91300 SARS-COV-2 / COVID-19 MRNA VACCINE (PFIZER-BIONTECH) 30 MCG: CPT

## 2021-02-10 PROCEDURE — 0002A SARS-COV-2 / COVID-19 MRNA VACCINE (PFIZER-BIONTECH) 30 MCG: CPT

## 2021-09-16 DIAGNOSIS — I21.19 ACUTE ST ELEVATION MYOCARDIAL INFARCTION (STEMI) OF INFERIOR WALL (HCC): ICD-10-CM

## 2021-09-16 DIAGNOSIS — E78.2 MIXED HYPERLIPIDEMIA: ICD-10-CM

## 2021-09-17 RX ORDER — ATORVASTATIN CALCIUM 80 MG/1
TABLET, FILM COATED ORAL
Qty: 90 TABLET | Refills: 3 | Status: SHIPPED | OUTPATIENT
Start: 2021-09-17

## 2022-04-07 ENCOUNTER — HOSPITAL ENCOUNTER (EMERGENCY)
Facility: HOSPITAL | Age: 77
Discharge: HOME | End: 2022-04-07
Attending: FAMILY MEDICINE
Payer: MEDICARE

## 2022-04-07 ENCOUNTER — APPOINTMENT (EMERGENCY)
Dept: CT IMAGING | Facility: HOSPITAL | Age: 77
End: 2022-04-07
Payer: MEDICARE

## 2022-04-07 VITALS
SYSTOLIC BLOOD PRESSURE: 169 MMHG | HEART RATE: 64 BPM | BODY MASS INDEX: 29.4 KG/M2 | TEMPERATURE: 97.6 F | RESPIRATION RATE: 16 BRPM | OXYGEN SATURATION: 98 % | WEIGHT: 210 LBS | DIASTOLIC BLOOD PRESSURE: 77 MMHG | HEIGHT: 71 IN

## 2022-04-07 DIAGNOSIS — S16.1XXA STRAIN OF NECK MUSCLE, INITIAL ENCOUNTER: ICD-10-CM

## 2022-04-07 DIAGNOSIS — M62.838 NECK MUSCLE SPASM: Primary | ICD-10-CM

## 2022-04-07 PROCEDURE — 96374 THER/PROPH/DIAG INJ IV PUSH: CPT

## 2022-04-07 PROCEDURE — 99284 EMERGENCY DEPT VISIT MOD MDM: CPT | Performed by: FAMILY MEDICINE

## 2022-04-07 PROCEDURE — G1004 CDSM NDSC: HCPCS

## 2022-04-07 PROCEDURE — 99283 EMERGENCY DEPT VISIT LOW MDM: CPT

## 2022-04-07 PROCEDURE — 72125 CT NECK SPINE W/O DYE: CPT

## 2022-04-07 RX ORDER — METHOCARBAMOL 500 MG/1
500 TABLET, FILM COATED ORAL 2 TIMES DAILY
Qty: 10 TABLET | Refills: 0 | Status: SHIPPED | OUTPATIENT
Start: 2022-04-07 | End: 2022-04-12

## 2022-04-07 RX ORDER — HYDROCODONE BITARTRATE AND ACETAMINOPHEN 5; 325 MG/1; MG/1
1 TABLET ORAL ONCE
Status: COMPLETED | OUTPATIENT
Start: 2022-04-07 | End: 2022-04-07

## 2022-04-07 RX ORDER — METHOCARBAMOL 500 MG/1
500 TABLET, FILM COATED ORAL ONCE
Status: COMPLETED | OUTPATIENT
Start: 2022-04-07 | End: 2022-04-07

## 2022-04-07 RX ORDER — PREDNISONE 20 MG/1
40 TABLET ORAL DAILY
Qty: 10 TABLET | Refills: 0 | Status: SHIPPED | OUTPATIENT
Start: 2022-04-07 | End: 2022-04-12

## 2022-04-07 RX ORDER — DEXAMETHASONE SODIUM PHOSPHATE 4 MG/ML
10 INJECTION, SOLUTION INTRA-ARTICULAR; INTRALESIONAL; INTRAMUSCULAR; INTRAVENOUS; SOFT TISSUE ONCE
Status: COMPLETED | OUTPATIENT
Start: 2022-04-07 | End: 2022-04-07

## 2022-04-07 RX ADMIN — DEXAMETHASONE SODIUM PHOSPHATE 10 MG: 4 INJECTION, SOLUTION INTRAMUSCULAR; INTRAVENOUS at 08:08

## 2022-04-07 RX ADMIN — METHOCARBAMOL 500 MG: 500 TABLET ORAL at 08:05

## 2022-04-07 RX ADMIN — HYDROCODONE BITARTRATE AND ACETAMINOPHEN 1 TABLET: 5; 325 TABLET ORAL at 08:05

## 2022-04-07 NOTE — ED PROVIDER NOTES
History  Chief Complaint   Patient presents with    Neck Pain     neck pain and headache started last Saturday and has intensified since  Muscles in neck feel stiff, cannot turn head left and right  HPI  This is a 59-year-old male with history of polymyalgia rheumatica presented with complain of neck pain and shoulder pain  Patient states the pain started on last Saturday and has been getting worse since then  Patient states he has oxycodone which she tried at home without any improvement  He states he is having trouble turning his head from left to right  Denies any trauma or injury  He states he has appointment with his dermatologist next month  Rating his pain 8/10  Denies any nausea vomiting denies any headache could denies any blurry vision  Denies chest pain or shortness of breath denies any numbness or tingling  Prior to Admission Medications   Prescriptions Last Dose Informant Patient Reported?  Taking?   atorvastatin (LIPITOR) 80 mg tablet   No No   Sig: take 1 tablet by mouth every evening   cholecalciferol (VITAMIN D3) 1,000 units tablet  Self Yes No   Sig: Take 2,000 Units by mouth    clopidogrel (PLAVIX) 75 mg tablet   No No   Sig: take 1 tablet by mouth once daily   folic acid (FOLVITE) 1 mg tablet  Self Yes No   Sig: Take 3 mg by mouth daily    methotrexate 2 5 mg tablet  Self Yes No   Sig: Take 10 mg by mouth once a week     nitroglycerin (NITROSTAT) 0 4 mg SL tablet  Self No No   Sig: Place 1 tablet (0 4 mg total) under the tongue every 5 (five) minutes as needed for chest pain   oxyCODONE-acetaminophen (PERCOCET) 7 5-325 MG per tablet  Self Yes No   Sig: Take 7 5 tablets by mouth 2 (two) times a day as needed   pantoprazole (PROTONIX) 40 mg tablet  Self Yes No   Sig: Take 40 mg by mouth daily   tamsulosin (FLOMAX) 0 4 mg  Self Yes No   Sig: Take by mouth      Facility-Administered Medications: None       Past Medical History:   Diagnosis Date    Benign essential HTN 10/24/2018    [facility]     Coronary artery disease     ALVIN to mid RCA 8/8/2018    GERD (gastroesophageal reflux disease)     History of transfusion     Polymyalgia (HCC)     ST elevation myocardial [facility] ([facility]) [facility] inferior wall [facility] ) 08/08/2018       Past Surgical History:   Procedure Laterality Date    BACK SURGERY      CORONARY ANGIOPLASTY WITH STENT PLACEMENT  08/08/2018    ALVIN to mid RCA (99%)   EYE SURGERY      SKIN BIOPSY         Family History   Problem Relation Age of Onset    Heart attack Mother     Heart disease Brother      I have reviewed and agree with the history as documented  E-Cigarette/Vaping     E-Cigarette/Vaping Substances     Social History     Tobacco Use    Smoking status: Former Smoker     Packs/day: 0 50     Years: 50 00     Pack years: 25 00     Quit date: 8/8/2018     Years since quitting: 3 6    Smokeless tobacco: Never Used    Tobacco comment: 1 cig  2-3 days   Substance Use Topics    Alcohol use: Yes     Alcohol/week: 2 0 standard drinks     Types: 2 Cans of beer per week    Drug use: No       Review of Systems   Constitutional: Negative  HENT: Negative  Eyes: Negative  Respiratory: Negative  Cardiovascular: Negative  Gastrointestinal: Negative  Endocrine: Negative  Genitourinary: Negative  Musculoskeletal: Positive for neck pain  Skin: Negative  Allergic/Immunologic: Negative  Neurological: Negative  Hematological: Negative  Psychiatric/Behavioral: Negative  Physical Exam  Physical Exam  Vitals and nursing note reviewed  Constitutional:       Appearance: Normal appearance  He is not ill-appearing  HENT:      Head: Normocephalic and atraumatic  Nose: Nose normal       Mouth/Throat:      Mouth: Mucous membranes are moist    Eyes:      Extraocular Movements: Extraocular movements intact        Conjunctiva/sclera: Conjunctivae normal       Pupils: Pupils are equal, round, and reactive to light  Cardiovascular:      Rate and Rhythm: Normal rate and regular rhythm  Pulses: Normal pulses  Pulmonary:      Effort: Pulmonary effort is normal       Breath sounds: Normal breath sounds  Abdominal:      General: Bowel sounds are normal       Palpations: Abdomen is soft  Tenderness: There is no abdominal tenderness  Musculoskeletal:      Cervical back: Neck supple  Tenderness (decrease range of motion due to pain) present  Skin:     General: Skin is warm  Neurological:      General: No focal deficit present  Mental Status: He is alert and oriented to person, place, and time  Psychiatric:         Mood and Affect: Mood normal          Behavior: Behavior normal          Vital Signs  ED Triage Vitals [04/07/22 0743]   Temperature Pulse Respirations Blood Pressure SpO2   97 6 °F (36 4 °C) 64 16 169/77 98 %      Temp Source Heart Rate Source Patient Position - Orthostatic VS BP Location FiO2 (%)   Tympanic Monitor Sitting Right arm --      Pain Score       9           Vitals:    04/07/22 0743   BP: 169/77   Pulse: 64   Patient Position - Orthostatic VS: Sitting         Visual Acuity      ED Medications  Medications   dexamethasone (DECADRON) injection 10 mg (10 mg Intravenous Given 4/7/22 0808)   HYDROcodone-acetaminophen (NORCO) 5-325 mg per tablet 1 tablet (1 tablet Oral Given 4/7/22 0805)   methocarbamol (ROBAXIN) tablet 500 mg (500 mg Oral Given 4/7/22 0805)       Diagnostic Studies  Results Reviewed     None                 CT spine cervical without contrast   Final Result by Jaqueline Griffin MD (04/07 9918)      No cervical spine fracture or traumatic malalignment  Workstation performed: [id number]                    Procedures  Procedures         ED Course  ED Course as of 04/07/22 0912   Thu Apr 07, 2022   1678 Patient states he is feeling much better able to move his neck without any difficulty  8940 CT spine cervical within normal limits  recommended continue taking prednisone and follow-up with dermatology  Precaution provided regarding return                                        MDM    Disposition  Final diagnoses:   Neck muscle spasm   Strain of neck muscle, initial encounter     Time reflects when diagnosis was documented in both MDM as applicable and the Disposition within this note     Time User Action Codes Description Comment    4/7/2022  9:11 AM Forfadumo Rodriguez Add [C59 539] Neck muscle spasm     4/7/2022  9:11 AM Forcorytine Enriquetah Add [S16  1XXA] Strain of neck muscle, initial encounter       ED Disposition     ED Disposition Condition Date/Time Comment    Discharge Stable Thu Apr 7, 2022  9:11 AM Zelalem Hallman discharge to home/self care  Follow-up Information     Follow up With Specialties Details Why Contact Araceli Garcia DO Family Medicine Schedule an appointment as soon as possible for a visit in 2 days If symptoms worsen [address] [place] [address]            Patient's Medications   Discharge Prescriptions    METHOCARBAMOL (ROBAXIN) 500 MG TABLET    Take 1 tablet (500 mg total) by mouth 2 (two) times a day for 5 days       Start Date: 4/7/2022  End Date: 4/12/2022       Order Dose: 500 mg       Quantity: 10 tablet    Refills: 0    PREDNISONE 20 MG TABLET    Take 2 tablets (40 mg total) by mouth daily for 5 days       Start Date: 4/7/2022  End Date: 4/12/2022       Order Dose: 40 mg       Quantity: 10 tablet    Refills: 0       No discharge procedures on file      PDMP Review     None          ED Provider  Electronically Signed by           Regis Chaney MD  04/07/22 9646

## 2022-06-02 ENCOUNTER — OFFICE VISIT (OUTPATIENT)
Dept: CARDIOLOGY CLINIC | Facility: CLINIC | Age: 77
End: 2022-06-02
Payer: MEDICARE

## 2022-06-02 VITALS
SYSTOLIC BLOOD PRESSURE: 132 MMHG | DIASTOLIC BLOOD PRESSURE: 80 MMHG | HEART RATE: 55 BPM | HEIGHT: 71 IN | WEIGHT: 212 LBS | BODY MASS INDEX: 29.68 KG/M2

## 2022-06-02 DIAGNOSIS — I25.10 CORONARY ARTERY DISEASE INVOLVING NATIVE CORONARY ARTERY OF NATIVE HEART WITHOUT ANGINA PECTORIS: Primary | ICD-10-CM

## 2022-06-02 DIAGNOSIS — I77.810 AORTIC ECTASIA, THORACIC (HCC): ICD-10-CM

## 2022-06-02 DIAGNOSIS — E78.2 MIXED HYPERLIPIDEMIA: ICD-10-CM

## 2022-06-02 DIAGNOSIS — Z72.0 TOBACCO ABUSE: ICD-10-CM

## 2022-06-02 PROCEDURE — 99214 OFFICE O/P EST MOD 30 MIN: CPT | Performed by: INTERNAL MEDICINE

## 2022-06-02 PROCEDURE — 93000 ELECTROCARDIOGRAM COMPLETE: CPT | Performed by: INTERNAL MEDICINE

## 2022-06-02 RX ORDER — METHYLPREDNISOLONE 4 MG/1
TABLET ORAL
COMMUNITY
Start: 2022-05-31

## 2022-06-02 NOTE — PROGRESS NOTES
Patient ID: Juan Mcgee is a 68 y o  male  Plan: Aortic ectasia, thoracic (HCC)  3 9 cm aortic root in 2018  Will recheck by echo  Tobacco abuse  2 cigarettes/day at most     Mixed hyperlipidemia  Tolerating high-intensity statin therapy and will continue current regimen  Coronary artery disease involving native coronary artery of native heart  Coronary stenting in August of 2018  Intolerant of aspirin  Continues on clopidogrel  Follow up Plan/Other summary comments:  Return in about 1 year (around 6/2/2023)  HPI: Patient seen in f/u regarding the above issues  There is very very mild exertional dyspnea  No CP reminiscent of what he experienced prior to acute coronary syndrome  To reiterate, in August of 2018 patient had acute coronary syndrome and underwent drug-eluting stent to the right coronary artery  He has had no coronary symptoms since then  He is on clopidogrel at present as he had GI issues on aspirin  Results for orders placed or performed in visit on 06/02/22   POCT ECG    Impression    NSR  NSSTs  Most recent or relevant cardiac/vascular testing:       Echo 10/15/2018:  Normal EF  Mild aortic root dilation  Past Surgical History:   Procedure Laterality Date    BACK SURGERY      CORONARY ANGIOPLASTY WITH STENT PLACEMENT  08/08/2018    ALVIN to mid RCA (99%)   EYE SURGERY      SKIN BIOPSY       CMP:   Lab Results   Component Value Date    K 4 0 04/05/2019     04/05/2019    CO2 28 04/05/2019    BUN 14 04/05/2019    CREATININE 1 07 04/05/2019    EGFR 68 04/05/2019       Lipid Profile:   Lab Results   Component Value Date    TRIG 143 08/09/2018    HDL 35 (L) 08/09/2018         Review of Systems   10  point ROS  was otherwise non pertinent or negative except as per HPI or as below     Gait: Normal          Objective:     /80 (BP Location: Left arm, Patient Position: Sitting, Cuff Size: Standard)   Pulse 55   Ht 5' 10 5" (1 791 m)   Wt 96 2 kg (212 lb)   BMI 29 99 kg/m²     PHYSICAL EXAM:    General:  Normal appearance in no distress  Eyes:  Anicteric  Oral mucosa:  Moist   Neck:  No JVD  Carotid upstrokes are brisk without bruits  No masses  Chest:  Clear to auscultation  Cardiac:  No palpable PMI  Normal S1 and S2  No murmur gallop or rub  Abdomen:  Soft and nontender  No palpable organomegaly or aortic enlargement  Extremities:  No peripheral edema  Musculoskeletal:  Symmetric  Vascular:  Femoral pulses are brisk without bruits  Popliteal pulses are intact bilaterally  Pedal pulses are intact  Neuro:  Grossly symmetric  Psych:  Alert and oriented x3  Current Outpatient Medications:     atorvastatin (LIPITOR) 80 mg tablet, take 1 tablet by mouth every evening, Disp: 90 tablet, Rfl: 3    cholecalciferol (VITAMIN D3) 1,000 units tablet, Take 2,000 Units by mouth , Disp: , Rfl:     clopidogrel (PLAVIX) 75 mg tablet, take 1 tablet by mouth once daily, Disp: 90 tablet, Rfl: 3    folic acid (FOLVITE) 1 mg tablet, Take 3 mg by mouth daily , Disp: , Rfl:     methotrexate 2 5 mg tablet, Take 10 mg by mouth once a week  , Disp: , Rfl:     methylPREDNISolone 4 MG tablet therapy pack, Take as directed per package instructions  , Disp: , Rfl:     nitroglycerin (NITROSTAT) 0 4 mg SL tablet, Place 1 tablet (0 4 mg total) under the tongue every 5 (five) minutes as needed for chest pain, Disp: 90 tablet, Rfl: 0    oxyCODONE-acetaminophen (PERCOCET) 7 5-325 MG per tablet, Take 7 5 tablets by mouth 2 (two) times a day as needed, Disp: , Rfl:     pantoprazole (PROTONIX) 40 mg tablet, Take 40 mg by mouth daily, Disp: , Rfl: 0    tamsulosin (FLOMAX) 0 4 mg, Take by mouth, Disp: , Rfl:     methocarbamol (ROBAXIN) 500 mg tablet, Take 1 tablet (500 mg total) by mouth 2 (two) times a day for 5 days, Disp: 10 tablet, Rfl: 0  No Known Allergies  Past Medical History:   Diagnosis Date    Benign essential HTN 10/24/2018    [facility]     Coronary artery disease     ALVIN to mid RCA 8/8/2018    GERD (gastroesophageal reflux disease)     History of transfusion     Polymyalgia (HCC)     ST elevation myocardial infarction (STEMI) of inferior wall ([facility] ) 08/08/2018           Social History     Tobacco Use   Smoking Status Former Smoker    Packs/day: 0 50    Years: 50 00    Pack years: 25 00    Quit date: 8/8/2018    Years since quitting: 3 8   Smokeless Tobacco Never Used   Tobacco Comment    1 cig   2-3 days

## 2022-07-18 ENCOUNTER — HOSPITAL ENCOUNTER (OUTPATIENT)
Dept: NON INVASIVE DIAGNOSTICS | Facility: CLINIC | Age: 77
Discharge: HOME | End: 2022-07-18
Payer: MEDICARE

## 2022-07-18 VITALS
HEART RATE: 53 BPM | SYSTOLIC BLOOD PRESSURE: 132 MMHG | WEIGHT: 212 LBS | HEIGHT: 71 IN | BODY MASS INDEX: 29.68 KG/M2 | DIASTOLIC BLOOD PRESSURE: 80 MMHG

## 2022-07-18 DIAGNOSIS — I77.810 AORTIC ECTASIA, THORACIC (HCC): ICD-10-CM

## 2022-07-18 DIAGNOSIS — I25.10 CORONARY ARTERY DISEASE INVOLVING NATIVE CORONARY ARTERY OF NATIVE HEART WITHOUT ANGINA PECTORIS: ICD-10-CM

## 2022-07-18 LAB
AORTIC ROOT: 4 CM
AORTIC VALVE MEAN VELOCITY: 9 M/S
APICAL FOUR CHAMBER EJECTION FRACTION: 66 %
ASCENDING AORTA: 3.8 CM
AV LVOT MEAN GRADIENT: 2 MMHG
AV LVOT PEAK GRADIENT: 5 MMHG
AV MEAN GRADIENT: 4 MMHG
AV PEAK GRADIENT: 7 MMHG
AV VELOCITY RATIO: 0.86
DOP CALC AO PEAK VEL: 1.31 M/S
DOP CALC AO VTI: 33.57 CM
DOP CALC LVOT PEAK VEL VTI: 24.44 CM
DOP CALC LVOT PEAK VEL: 1.13 M/S
E WAVE DECELERATION TIME: 211 MS
FRACTIONAL SHORTENING: 33 (ref 28–44)
INTERVENTRICULAR SEPTUM IN DIASTOLE (PARASTERNAL SHORT AXIS VIEW): 1.1 CM
INTERVENTRICULAR SEPTUM: 1.1 CM (ref 0.6–1.1)
LAAS-AP2: 20.3 CM2
LAAS-AP4: 16.2 CM2
LEFT ATRIUM AREA SYSTOLE SINGLE PLANE A4C: 15.9 CM2
LEFT ATRIUM SIZE: 4.1 CM
LEFT INTERNAL DIMENSION IN SYSTOLE: 3.2 CM (ref 2.1–4)
LEFT VENTRICULAR INTERNAL DIMENSION IN DIASTOLE: 4.8 CM (ref 3.5–6)
LEFT VENTRICULAR POSTERIOR WALL IN END DIASTOLE: 1.1 CM
LEFT VENTRICULAR STROKE VOLUME: 65 ML
LVSV (TEICH): 65 ML
MV E'TISSUE VEL-SEP: 9 CM/S
MV PEAK A VEL: 0.62 M/S
MV PEAK E VEL: 79 CM/S
MV STENOSIS PRESSURE HALF TIME: 61 MS
MV VALVE AREA P 1/2 METHOD: 3.61
RIGHT ATRIUM AREA SYSTOLE A4C: 13.4 CM2
RIGHT VENTRICLE ID DIMENSION: 3.4 CM
SL CV LEFT ATRIUM LENGTH A2C: 5.2 CM
SL CV LV EF: 60
SL CV PED ECHO LEFT VENTRICLE DIASTOLIC VOLUME (MOD BIPLANE) 2D: 105 ML
SL CV PED ECHO LEFT VENTRICLE SYSTOLIC VOLUME (MOD BIPLANE) 2D: 40 ML
TR MAX PG: 24 MMHG
TR PEAK VELOCITY: 2.5 M/S
TRICUSPID VALVE PEAK REGURGITATION VELOCITY: 2.47 M/S

## 2022-07-18 PROCEDURE — 93306 TTE W/DOPPLER COMPLETE: CPT

## 2022-07-18 PROCEDURE — 93306 TTE W/DOPPLER COMPLETE: CPT | Performed by: INTERNAL MEDICINE

## 2022-09-18 ENCOUNTER — OFFICE VISIT (OUTPATIENT)
Dept: URGENT CARE | Facility: CLINIC | Age: 77
End: 2022-09-18
Payer: MEDICARE

## 2022-09-18 VITALS
RESPIRATION RATE: 16 BRPM | DIASTOLIC BLOOD PRESSURE: 67 MMHG | HEART RATE: 65 BPM | OXYGEN SATURATION: 97 % | SYSTOLIC BLOOD PRESSURE: 161 MMHG | TEMPERATURE: 97.6 F

## 2022-09-18 DIAGNOSIS — E78.2 MIXED HYPERLIPIDEMIA: ICD-10-CM

## 2022-09-18 DIAGNOSIS — M54.50 ACUTE RIGHT-SIDED LOW BACK PAIN WITHOUT SCIATICA: Primary | ICD-10-CM

## 2022-09-18 DIAGNOSIS — I21.19 ACUTE ST ELEVATION MYOCARDIAL INFARCTION (STEMI) OF INFERIOR WALL (HCC): ICD-10-CM

## 2022-09-18 PROCEDURE — G0463 HOSPITAL OUTPT CLINIC VISIT: HCPCS | Performed by: STUDENT IN AN ORGANIZED HEALTH CARE EDUCATION/TRAINING PROGRAM

## 2022-09-18 PROCEDURE — 99213 OFFICE O/P EST LOW 20 MIN: CPT | Performed by: STUDENT IN AN ORGANIZED HEALTH CARE EDUCATION/TRAINING PROGRAM

## 2022-09-18 RX ORDER — CYCLOBENZAPRINE HCL 10 MG
10 TABLET ORAL 3 TIMES DAILY PRN
Qty: 15 TABLET | Refills: 0 | Status: SHIPPED | OUTPATIENT
Start: 2022-09-18 | End: 2022-09-23

## 2022-09-18 NOTE — PROGRESS NOTES
[facility]        NAME: Jennifer Louis is a 68 y o  male  : 1945    MRN: [id number]  DATE: 2022  TIME: 8:51 AM    Assessment and Orders   Acute right-sided low back pain without sciatica [M54 50]  1  Acute right-sided low back pain without sciatica  cyclobenzaprine (FLEXERIL) 10 mg tablet    Ambulatory Referral to Physical Therapy         Plan and Discussion      Symptoms and exam consistent with lumbar paraspinal muscle spasm  Rx for symptom relief: Flexeril      Counseling: frequent stretching, apply heat to the area    Risks and benefits discussed  Patient understands and agrees with the plan  Follow up with PCP  Chief Complaint     Chief Complaint   Patient presents with    Back Pain     Pt c/o back pain since Tuesday  Pt was doing yardwork  History of Present Illness       Back Pain  This is a new problem  The current episode started in the past 7 days  The problem occurs constantly  The problem has been gradually worsening since onset  The pain is present in the lumbar spine  The quality of the pain is described as stabbing, shooting and cramping  The pain does not radiate  The pain is worse during the day  The symptoms are aggravated by bending, position and twisting  Pertinent negatives include no abdominal pain, bladder incontinence, bowel incontinence, chest pain, dysuria, fever, numbness, paresis, paresthesias, tingling or weakness  Review of Systems   Review of Systems   Constitutional: Negative for fever  Cardiovascular: Negative for chest pain  Gastrointestinal: Negative for abdominal pain and bowel incontinence  Genitourinary: Negative for bladder incontinence and dysuria  Musculoskeletal: Positive for back pain  Neurological: Negative for tingling, weakness, numbness and paresthesias           Current Medications       Current Outpatient Medications:     cyclobenzaprine (FLEXERIL) 10 mg tablet, Take 1 tablet (10 mg total) by mouth 3 (three) times a day as needed for muscle spasms for up to 5 days, Disp: 15 tablet, Rfl: 0    atorvastatin (LIPITOR) 80 mg tablet, take 1 tablet by mouth every evening, Disp: 90 tablet, Rfl: 3    cholecalciferol (VITAMIN D3) 1,000 units tablet, Take 2,000 Units by mouth , Disp: , Rfl:     clopidogrel (PLAVIX) 75 mg tablet, take 1 tablet by mouth once daily, Disp: 90 tablet, Rfl: 3    folic acid (FOLVITE) 1 mg tablet, Take 3 mg by mouth daily , Disp: , Rfl:     methocarbamol (ROBAXIN) 500 mg tablet, Take 1 tablet (500 mg total) by mouth 2 (two) times a day for 5 days, Disp: 10 tablet, Rfl: 0    methotrexate 2 5 mg tablet, Take 10 mg by mouth once a week  , Disp: , Rfl:     methylPREDNISolone 4 MG tablet therapy pack, Take as directed per package instructions  , Disp: , Rfl:     nitroglycerin (NITROSTAT) 0 4 mg SL tablet, Place 1 tablet (0 4 mg total) under the tongue every 5 (five) minutes as needed for chest pain, Disp: 90 tablet, Rfl: 0    oxyCODONE-acetaminophen (PERCOCET) 7 5-325 MG per tablet, Take 7 5 tablets by mouth 2 (two) times a day as needed, Disp: , Rfl:     pantoprazole (PROTONIX) 40 mg tablet, Take 40 mg by mouth daily, Disp: , Rfl: 0    tamsulosin (FLOMAX) 0 4 mg, Take by mouth, Disp: , Rfl:     Current Allergies     Allergies as of 09/18/2022    (No Known Allergies)            The following portions of the patient's history were reviewed and updated as appropriate: allergies, current medications, past family history, past medical history, past social history, past surgical history and problem list      Past Medical History:   Diagnosis Date    Benign essential HTN 10/24/2018    [facility]     Coronary artery disease     ALVIN to mid RCA 8/8/2018    GERD (gastroesophageal reflux disease)     History of transfusion     [facility] )     ST elevation myocardial infarction (STEMI) of inferior wall ([facility] ) 08/08/2018       Past Surgical History:   Procedure Laterality Date    BACK SURGERY      CORONARY ANGIOPLASTY WITH STENT PLACEMENT  08/08/2018    ALVIN to mid RCA (99%)   EYE SURGERY      SKIN BIOPSY         Family History   Problem Relation Age of Onset    Heart attack Mother     Heart disease Brother          Medications have been verified  Objective   /67   Pulse 65   Temp 97 6 °F (36 4 °C)   Resp 16   SpO2 97%   No LMP for male patient  Physical Exam     Physical Exam  Constitutional:       General: He is not in acute distress  Pulmonary:      Effort: Pulmonary effort is normal  No respiratory distress  Musculoskeletal:      Lumbar back: Spasms (right sided) present  No swelling, edema, deformity, signs of trauma, lacerations or bony tenderness  Decreased range of motion (decreased due to pain)  Negative right straight leg raise test and negative left straight leg raise test    Neurological:      General: No focal deficit present  Mental Status: He is alert and oriented to person, place, and time        Gait: Gait normal    Psychiatric:         Mood and Affect: Mood normal          Behavior: Behavior normal                Julisa Pitt DO

## 2022-09-19 RX ORDER — ATORVASTATIN CALCIUM 80 MG/1
TABLET, FILM COATED ORAL
Qty: 90 TABLET | Refills: 3 | Status: SHIPPED | OUTPATIENT
Start: 2022-09-19

## 2023-04-30 DIAGNOSIS — I25.10 CORONARY ARTERY DISEASE INVOLVING NATIVE CORONARY ARTERY OF NATIVE HEART: ICD-10-CM

## 2023-05-01 RX ORDER — CLOPIDOGREL BISULFATE 75 MG/1
TABLET ORAL
Qty: 90 TABLET | Refills: 3 | Status: SHIPPED | OUTPATIENT
Start: 2023-05-01

## 2023-09-24 DIAGNOSIS — E78.2 MIXED HYPERLIPIDEMIA: ICD-10-CM

## 2023-09-24 DIAGNOSIS — I21.19 ACUTE ST ELEVATION MYOCARDIAL INFARCTION (STEMI) OF INFERIOR WALL (HCC): ICD-10-CM

## 2023-09-25 RX ORDER — ATORVASTATIN CALCIUM 80 MG/1
TABLET, FILM COATED ORAL
Qty: 90 TABLET | Refills: 3 | Status: SHIPPED | OUTPATIENT
Start: 2023-09-25

## 2023-09-26 ENCOUNTER — OFFICE VISIT (OUTPATIENT)
Dept: CARDIOLOGY CLINIC | Facility: CLINIC | Age: 78
End: 2023-09-26
Payer: MEDICARE

## 2023-09-26 VITALS
DIASTOLIC BLOOD PRESSURE: 72 MMHG | BODY MASS INDEX: 30.72 KG/M2 | WEIGHT: 207.4 LBS | RESPIRATION RATE: 16 BRPM | OXYGEN SATURATION: 99 % | HEART RATE: 66 BPM | HEIGHT: 69 IN | SYSTOLIC BLOOD PRESSURE: 138 MMHG

## 2023-09-26 DIAGNOSIS — E78.2 MIXED HYPERLIPIDEMIA: ICD-10-CM

## 2023-09-26 DIAGNOSIS — M25.562 CHRONIC PAIN OF BOTH KNEES: ICD-10-CM

## 2023-09-26 DIAGNOSIS — I77.810 AORTIC ECTASIA, THORACIC (HCC): ICD-10-CM

## 2023-09-26 DIAGNOSIS — I21.19 ACUTE ST ELEVATION MYOCARDIAL INFARCTION (STEMI) OF INFERIOR WALL (HCC): Primary | ICD-10-CM

## 2023-09-26 DIAGNOSIS — M25.561 CHRONIC PAIN OF BOTH KNEES: ICD-10-CM

## 2023-09-26 DIAGNOSIS — I25.10 CORONARY ARTERY DISEASE INVOLVING NATIVE CORONARY ARTERY OF NATIVE HEART WITHOUT ANGINA PECTORIS: ICD-10-CM

## 2023-09-26 DIAGNOSIS — G89.29 CHRONIC PAIN OF BOTH KNEES: ICD-10-CM

## 2023-09-26 PROCEDURE — 93000 ELECTROCARDIOGRAM COMPLETE: CPT | Performed by: INTERNAL MEDICINE

## 2023-09-26 PROCEDURE — 99214 OFFICE O/P EST MOD 30 MIN: CPT | Performed by: INTERNAL MEDICINE

## 2023-09-26 RX ORDER — NITROGLYCERIN 0.4 MG/1
0.4 TABLET SUBLINGUAL
Qty: 25 TABLET | Refills: 5 | Status: SHIPPED | OUTPATIENT
Start: 2023-09-26

## 2023-09-26 NOTE — PROGRESS NOTES
Patient ID: Bashir Finnegan is a 68 y.o. male. Plan:      Chronic pain of both knees  Somewhat disabling. Will refer to ortho. Coronary artery disease involving native coronary artery of native heart  Coronary stenting of the RCA. in August of 2018. Intolerant of aspirin. Continues on clopidogrel. Mixed hyperlipidemia  Tolerating high-intensity statin therapy and will continue current regimen. Aortic ectasia, thoracic (HCC)  4.0 cm aortic root in 2022. .   Will recheck by echo. Follow up Plan/Other summary comments:  Echo soon. If stable, f/u visit in 1  year    HPI: Patient seen in f/u regarding the above issues. No new cardiac symptoms. Knee pain bilateral limits him. No changes otherwise. To reiterate, in August of 2018 patient had acute coronary syndrome and underwent drug-eluting stent to the right coronary artery. He has had no coronary symptoms since then. He is on clopidogrel at present as he had GI issues on aspirin. Results for orders placed or performed in visit on 09/26/23   POCT ECG    Impression    NSR. Minor NSSTs. Most recent or relevant cardiac/vascular testing:    TTE 7/18/2022: AoRoot 4.0 cm. Otherwise WNL. Past Surgical History:   Procedure Laterality Date   • BACK SURGERY     • CORONARY ANGIOPLASTY WITH STENT PLACEMENT  08/08/2018    ALVIN to mid RCA (99%). • EYE SURGERY     • SKIN BIOPSY         Lipid Profile:   Lab Results   Component Value Date    TRIG 143 08/09/2018    HDL 35 (L) 08/09/2018         Review of Systems   10  point ROS  was otherwise non pertinent or negative except as per HPI or as below. Gait:  Normal.        Objective:     /72 (BP Location: Left arm, Patient Position: Sitting, Cuff Size: Standard)   Pulse 66   Resp 16   Ht 5' 9" (1.753 m)   Wt 94.1 kg (207 lb 6.4 oz)   SpO2 99%   BMI 30.63 kg/m²     PHYSICAL EXAM:    General:  Normal appearance in no distress. Eyes:  Anicteric.   Oral mucosa: Moist.  Neck:  No JVD. Carotid upstrokes are brisk without bruits. No masses. Chest:  Clear to auscultation. Cardiac:  No palpable PMI. Normal S1 and S2. No murmur gallop or rub. Abdomen:  Soft and nontender. No palpable organomegaly or aortic enlargement. Extremities:  No peripheral edema. Musculoskeletal:  Symmetric. Vascular:  Femoral pulses are brisk without bruits. Popliteal pulses are intact bilaterally. Pedal pulses are intact. Neuro:  Grossly symmetric. Psych:  Alert and oriented x3. Current Outpatient Medications:   •  atorvastatin (LIPITOR) 80 mg tablet, take 1 tablet by mouth every evening, Disp: 90 tablet, Rfl: 3  •  cholecalciferol (VITAMIN D3) 1,000 units tablet, Take 2,000 Units by mouth , Disp: , Rfl:   •  clopidogrel (PLAVIX) 75 mg tablet, take 1 tablet by mouth once daily, Disp: 90 tablet, Rfl: 3  •  folic acid (FOLVITE) 1 mg tablet, Take 3 mg by mouth daily , Disp: , Rfl:   •  methotrexate 2.5 mg tablet, Take 10 mg by mouth once a week  , Disp: , Rfl:   •  nitroglycerin (NITROSTAT) 0.4 mg SL tablet, Place 1 tablet (0.4 mg total) under the tongue every 5 (five) minutes as needed for chest pain, Disp: 25 tablet, Rfl: 5  •  oxyCODONE-acetaminophen (PERCOCET) 7.5-325 MG per tablet, Take 7.5 tablets by mouth 2 (two) times a day as needed, Disp: , Rfl:   •  pantoprazole (PROTONIX) 40 mg tablet, Take 40 mg by mouth daily, Disp: , Rfl: 0  •  tamsulosin (FLOMAX) 0.4 mg, Take by mouth, Disp: , Rfl:   •  cyclobenzaprine (FLEXERIL) 10 mg tablet, Take 1 tablet (10 mg total) by mouth 3 (three) times a day as needed for muscle spasms for up to 5 days, Disp: 15 tablet, Rfl: 0  •  methocarbamol (ROBAXIN) 500 mg tablet, Take 1 tablet (500 mg total) by mouth 2 (two) times a day for 5 days, Disp: 10 tablet, Rfl: 0  •  methylPREDNISolone 4 MG tablet therapy pack, Take as directed per package instructions. , Disp: , Rfl:   No Known Allergies  Past Medical History:   Diagnosis Date   • Benign essential HTN 10/24/2018   • COPD (chronic obstructive pulmonary disease) (HCC)    • Coronary artery disease     ALVIN to mid RCA 2018   • GERD (gastroesophageal reflux disease)    • History of transfusion    • Polymyalgia (HCC)    • ST elevation myocardial infarction (STEMI) of inferior wall ([address]) 2018           Social History     Tobacco Use   Smoking Status Former   • Packs/day: 0.50   • Years: 50.00   • Total pack years: 25.00   • Types: Cigarettes   • Quit date: 2018   • Years since quittin.1   Smokeless Tobacco Never   Tobacco Comments    1 cig.  2-3 days

## 2023-10-11 ENCOUNTER — HOSPITAL ENCOUNTER (OUTPATIENT)
Dept: NON INVASIVE DIAGNOSTICS | Facility: CLINIC | Age: 78
Discharge: HOME | End: 2023-10-11
Payer: MEDICARE

## 2023-10-11 VITALS
HEART RATE: 64 BPM | SYSTOLIC BLOOD PRESSURE: 126 MMHG | DIASTOLIC BLOOD PRESSURE: 70 MMHG | WEIGHT: 207 LBS | BODY MASS INDEX: 30.66 KG/M2 | HEIGHT: 69 IN

## 2023-10-11 DIAGNOSIS — I77.810 AORTIC ECTASIA, THORACIC (HCC): ICD-10-CM

## 2023-10-11 LAB
AORTIC ROOT: 4 CM
AORTIC VALVE MEAN VELOCITY: 8.4 M/S
APICAL FOUR CHAMBER EJECTION FRACTION: 69 %
ASCENDING AORTA: 3.9 CM
AV LVOT MEAN GRADIENT: 3 MMHG
AV LVOT PEAK GRADIENT: 5 MMHG
AV MEAN GRADIENT: 3 MMHG
AV PEAK GRADIENT: 6 MMHG
AV VELOCITY RATIO: 0.9
DOP CALC AO PEAK VEL: 1.24 M/S
DOP CALC AO VTI: 30.85 CM
DOP CALC LVOT PEAK VEL VTI: 29.1 CM
DOP CALC LVOT PEAK VEL: 1.12 M/S
E WAVE DECELERATION TIME: 265 MS
E/A RATIO: 0.97
FRACTIONAL SHORTENING: 37 (ref 28–44)
INTERVENTRICULAR SEPTUM IN DIASTOLE (PARASTERNAL SHORT AXIS VIEW): 0.9 CM
INTERVENTRICULAR SEPTUM: 0.9 CM (ref 0.6–1.1)
LAAS-AP2: 21.3 CM2
LAAS-AP4: 25.1 CM2
LEFT ATRIUM SIZE: 4.4 CM
LEFT ATRIUM VOLUME (MOD BIPLANE): 77 ML
LEFT ATRIUM VOLUME INDEX (MOD BIPLANE): 36.7 ML/M2
LEFT INTERNAL DIMENSION IN SYSTOLE: 3.1 CM (ref 2.1–4)
LEFT VENTRICULAR INTERNAL DIMENSION IN DIASTOLE: 4.9 CM (ref 3.5–6)
LEFT VENTRICULAR POSTERIOR WALL IN END DIASTOLE: 0.9 CM
LEFT VENTRICULAR STROKE VOLUME: 73 ML
LVSV (TEICH): 73 ML
MV E'TISSUE VEL-SEP: 9 CM/S
MV PEAK A VEL: 0.78 M/S
MV PEAK E VEL: 76 CM/S
MV STENOSIS PRESSURE HALF TIME: 77 MS
MV VALVE AREA P 1/2 METHOD: 2.86
RIGHT ATRIUM AREA SYSTOLE A4C: 16.3 CM2
RIGHT VENTRICLE ID DIMENSION: 2.8 CM
SL CV LEFT ATRIUM LENGTH A2C: 5.7 CM
SL CV LV EF: 60
SL CV PED ECHO LEFT VENTRICLE DIASTOLIC VOLUME (MOD BIPLANE) 2D: 112 ML
SL CV PED ECHO LEFT VENTRICLE SYSTOLIC VOLUME (MOD BIPLANE) 2D: 39 ML
TR MAX PG: 25 MMHG
TR PEAK VELOCITY: 2.5 M/S
TRICUSPID ANNULAR PLANE SYSTOLIC EXCURSION: 1.9 CM
TRICUSPID VALVE PEAK REGURGITATION VELOCITY: 2.5 M/S

## 2023-10-11 PROCEDURE — 93306 TTE W/DOPPLER COMPLETE: CPT | Performed by: INTERNAL MEDICINE

## 2023-10-11 PROCEDURE — 93306 TTE W/DOPPLER COMPLETE: CPT

## 2023-10-12 ENCOUNTER — TELEPHONE (OUTPATIENT)
Dept: CARDIOLOGY CLINIC | Facility: CLINIC | Age: 78
End: 2023-10-12

## 2023-10-12 NOTE — TELEPHONE ENCOUNTER
----- Message from Baldemar Navarro MD sent at 10/11/2023  4:29 PM EDT -----  All looks unchanged from prior  Please tell patient.

## 2023-11-27 ENCOUNTER — TELEPHONE (OUTPATIENT)
Dept: CARDIOLOGY CLINIC | Facility: CLINIC | Age: 78
End: 2023-11-27

## 2023-11-27 NOTE — TELEPHONE ENCOUNTER
Melissa Gaspar came in to the office. . he wants to stop his atorvastatin for a week or two and see if that helps with his leg pain. . feels like an ache in the bones. He did make an appt with ortho but had to cancel due to other things. So before he reschedules, he would like to stop the statin first and see.

## 2023-12-11 ENCOUNTER — TELEPHONE (OUTPATIENT)
Dept: CARDIOLOGY CLINIC | Facility: CLINIC | Age: 78
End: 2023-12-11

## 2024-01-03 ENCOUNTER — TELEPHONE (OUTPATIENT)
Dept: CARDIOLOGY CLINIC | Facility: CLINIC | Age: 79
End: 2024-01-03

## 2024-01-03 NOTE — TELEPHONE ENCOUNTER
Patient stopped in to let you know that he has been on the Lipitor every other night for the last 2 weeks and the nights he takes it he is having pain in his legs and the night he doesn't take it he has no pain.  Where do we go from here?

## 2024-01-23 DIAGNOSIS — E78.2 MIXED HYPERLIPIDEMIA: Primary | ICD-10-CM

## 2024-01-23 RX ORDER — PRAVASTATIN SODIUM 20 MG
20 TABLET ORAL EVERY OTHER DAY
Qty: 20 TABLET | Refills: 0 | Status: SHIPPED | OUTPATIENT
Start: 2024-01-23

## 2024-02-19 DIAGNOSIS — E78.2 MIXED HYPERLIPIDEMIA: ICD-10-CM

## 2024-02-19 RX ORDER — PRAVASTATIN SODIUM 20 MG
20 TABLET ORAL EVERY OTHER DAY
Qty: 45 TABLET | Refills: 3 | Status: SHIPPED | OUTPATIENT
Start: 2024-02-19

## 2024-04-23 DIAGNOSIS — I25.10 CORONARY ARTERY DISEASE INVOLVING NATIVE CORONARY ARTERY OF NATIVE HEART: ICD-10-CM

## 2024-04-25 RX ORDER — CLOPIDOGREL BISULFATE 75 MG/1
TABLET ORAL
Qty: 90 TABLET | Refills: 1 | Status: SHIPPED | OUTPATIENT
Start: 2024-04-25

## 2024-10-07 ENCOUNTER — OFFICE VISIT (OUTPATIENT)
Dept: CARDIOLOGY CLINIC | Facility: CLINIC | Age: 79
End: 2024-10-07
Payer: MEDICARE

## 2024-10-07 VITALS
HEIGHT: 69 IN | SYSTOLIC BLOOD PRESSURE: 144 MMHG | BODY MASS INDEX: 29.77 KG/M2 | HEART RATE: 71 BPM | WEIGHT: 201 LBS | DIASTOLIC BLOOD PRESSURE: 78 MMHG

## 2024-10-07 DIAGNOSIS — Z95.5 S/P DRUG ELUTING CORONARY STENT PLACEMENT: Primary | ICD-10-CM

## 2024-10-07 DIAGNOSIS — E78.2 MIXED HYPERLIPIDEMIA: ICD-10-CM

## 2024-10-07 DIAGNOSIS — I77.810 AORTIC ECTASIA, THORACIC (HCC): ICD-10-CM

## 2024-10-07 DIAGNOSIS — I25.10 CORONARY ARTERY DISEASE INVOLVING NATIVE CORONARY ARTERY OF NATIVE HEART WITHOUT ANGINA PECTORIS: ICD-10-CM

## 2024-10-07 PROCEDURE — 99214 OFFICE O/P EST MOD 30 MIN: CPT | Performed by: INTERNAL MEDICINE

## 2024-10-07 PROCEDURE — 93000 ELECTROCARDIOGRAM COMPLETE: CPT | Performed by: INTERNAL MEDICINE

## 2024-10-07 RX ORDER — PRAVASTATIN SODIUM 40 MG
40 TABLET ORAL EVERY OTHER DAY
Qty: 60 TABLET | Refills: 5 | Status: SHIPPED | OUTPATIENT
Start: 2024-10-07

## 2024-10-07 NOTE — PROGRESS NOTES
" Patient ID: Joseph Hallman is a 79 y.o. male.        Plan:      Assessment & Plan  Coronary artery disease involving native coronary artery of native heart without angina pectoris  Coronary stenting of the RCA. in August of 2018. Intolerant of aspirin.  Continues on clopidogrel.    Mixed hyperlipidemia  Had myalgia on atovastatin.  Doing well on qod pravastatin but will increase the dose.    Aortic ectasia, thoracic (HCC)  4.0 cm aortic root in 2022 and 2023.     Will recheck by echo next year.      Follow up Plan/Other summary comments:  Return in about 1 year (around 10/14/2025).    HPI: Patient seen in follow-up today regarding the above.  Since the last visit he has felt well.  No chest pain.  No change in exertional tolerance.      To reiterate, in August of 2018 patient had acute coronary syndrome and underwent drug-eluting stent to the right coronary artery.  He has had no coronary symptoms since then.  He is on clopidogrel at present as he had GI issues on aspirin.  Results for orders placed or performed in visit on 10/07/24   POCT ECG    Impression    NSR. WNL.         Most recent or relevant cardiac/vascular testing:    TTE 7/18/2022: AoRoot 4.0 cm. Otherwise WNL.  TTE 10/11/2023: No change.    Past Surgical History:   Procedure Laterality Date    BACK SURGERY      CORONARY ANGIOPLASTY WITH STENT PLACEMENT  08/08/2018    ALVIN to mid RCA (99%).    EYE SURGERY      SKIN BIOPSY         Lipid Profile: Reviewed      Review of Systems   10  point ROS  was otherwise non pertinent or negative except as per HPI or as below.   Gait:  Normal.        Objective:     /78   Pulse 71   Ht 5' 9\" (1.753 m)   Wt 91.2 kg (201 lb)   BMI 29.68 kg/m²     PHYSICAL EXAM:    General:  Normal appearance in no distress.  Eyes:  Anicteric.  Oral mucosa:  Moist.  Neck:  No JVD. Carotid upstrokes are brisk without bruits.  No masses.  Chest:  Clear to auscultation.  Cardiac:  No palpable PMI.  Normal S1 and S2.  No murmur " gallop or rub.  Abdomen:  Soft and nontender. No palpable organomegaly or aortic enlargement.  Extremities:  No peripheral edema.  Musculoskeletal:  Symmetric.   Vascular:  Femoral pulses are brisk without bruits.  Popliteal pulses are intact bilaterally.   Pedal pulses are intact.  Neuro:  Grossly symmetric.  Psych:  Alert and oriented x3.      Meds reviewed.    Past Medical History:   Diagnosis Date    Benign essential HTN 10/24/2018    COPD (chronic obstructive [facility]) [facility]     Coronary artery disease     ALVIN to mid RCA 2018    GERD (gastroesophageal reflux disease)     History of transfusion     Polymyalgia ([facility])     ST elevation myocardial infarction (STEMI) of inferior wall ([facility]) 2018           Social History     Tobacco Use   Smoking Status Former    Current packs/day: 0.00    Average packs/day: 0.5 packs/day for 50.0 years (25.0 ttl pk-yrs)    Types: Cigarettes    Start date: 1968    Quit date: 2018    Years since quittin.1   Smokeless Tobacco Never   Tobacco Comments    1 cig. 2-3 days

## 2024-10-07 NOTE — ASSESSMENT & PLAN NOTE
Coronary stenting of the RCA. in August of 2018. Intolerant of aspirin.  Continues on clopidogrel.

## 2024-10-22 DIAGNOSIS — I25.10 CORONARY ARTERY DISEASE INVOLVING NATIVE CORONARY ARTERY OF NATIVE HEART: ICD-10-CM

## 2024-10-22 RX ORDER — CLOPIDOGREL BISULFATE 75 MG/1
TABLET ORAL
Qty: 90 TABLET | Refills: 1 | Status: SHIPPED | OUTPATIENT
Start: 2024-10-22

## 2025-04-18 DIAGNOSIS — I25.10 CORONARY ARTERY DISEASE INVOLVING NATIVE CORONARY ARTERY OF NATIVE HEART: ICD-10-CM

## 2025-04-18 RX ORDER — CLOPIDOGREL BISULFATE 75 MG/1
75 TABLET ORAL DAILY
Qty: 90 TABLET | Refills: 1 | Status: SHIPPED | OUTPATIENT
Start: 2025-04-18

## 2025-05-14 DIAGNOSIS — I25.10 CORONARY ARTERY DISEASE INVOLVING NATIVE CORONARY ARTERY OF NATIVE HEART: ICD-10-CM

## 2025-05-14 RX ORDER — CLOPIDOGREL BISULFATE 75 MG/1
75 TABLET ORAL DAILY
Qty: 90 TABLET | Refills: 3 | OUTPATIENT
Start: 2025-05-14

## 2025-08-11 ENCOUNTER — TELEPHONE (OUTPATIENT)
Dept: CARDIOLOGY CLINIC | Facility: CLINIC | Age: 80
End: 2025-08-11